# Patient Record
Sex: FEMALE | Race: WHITE | NOT HISPANIC OR LATINO | URBAN - METROPOLITAN AREA
[De-identification: names, ages, dates, MRNs, and addresses within clinical notes are randomized per-mention and may not be internally consistent; named-entity substitution may affect disease eponyms.]

---

## 2018-03-23 ENCOUNTER — INPATIENT (INPATIENT)
Facility: HOSPITAL | Age: 83
LOS: 3 days | Discharge: HOME HEALTH | DRG: 312 | End: 2018-03-27
Attending: INTERNAL MEDICINE | Admitting: INTERNAL MEDICINE
Payer: MEDICARE

## 2018-03-23 VITALS
HEART RATE: 80 BPM | DIASTOLIC BLOOD PRESSURE: 83 MMHG | OXYGEN SATURATION: 100 % | RESPIRATION RATE: 18 BRPM | SYSTOLIC BLOOD PRESSURE: 173 MMHG | TEMPERATURE: 98 F

## 2018-03-23 DIAGNOSIS — Z96.652 PRESENCE OF LEFT ARTIFICIAL KNEE JOINT: Chronic | ICD-10-CM

## 2018-03-23 LAB
ALBUMIN SERPL ELPH-MCNC: 3.9 G/DL — SIGNIFICANT CHANGE UP (ref 3.4–5)
ALP SERPL-CCNC: 130 U/L — HIGH (ref 40–120)
ALT FLD-CCNC: 16 U/L — SIGNIFICANT CHANGE UP (ref 12–42)
ANION GAP SERPL CALC-SCNC: 10 MMOL/L — SIGNIFICANT CHANGE UP (ref 9–16)
APPEARANCE UR: CLEAR — SIGNIFICANT CHANGE UP
APTT BLD: 27.2 SEC — LOW (ref 27.5–36.5)
AST SERPL-CCNC: 21 U/L — SIGNIFICANT CHANGE UP (ref 15–37)
BASOPHILS NFR BLD AUTO: 0.4 % — SIGNIFICANT CHANGE UP (ref 0–2)
BILIRUB SERPL-MCNC: 0.4 MG/DL — SIGNIFICANT CHANGE UP (ref 0.2–1.2)
BILIRUB UR-MCNC: NEGATIVE — SIGNIFICANT CHANGE UP
BUN SERPL-MCNC: 13 MG/DL — SIGNIFICANT CHANGE UP (ref 7–23)
CALCIUM SERPL-MCNC: 10.1 MG/DL — SIGNIFICANT CHANGE UP (ref 8.5–10.5)
CHLORIDE SERPL-SCNC: 98 MMOL/L — SIGNIFICANT CHANGE UP (ref 96–108)
CO2 SERPL-SCNC: 26 MMOL/L — SIGNIFICANT CHANGE UP (ref 22–31)
COLOR SPEC: YELLOW — SIGNIFICANT CHANGE UP
CREAT SERPL-MCNC: 0.98 MG/DL — SIGNIFICANT CHANGE UP (ref 0.5–1.3)
DIFF PNL FLD: (no result)
EOSINOPHIL NFR BLD AUTO: 0.3 % — SIGNIFICANT CHANGE UP (ref 0–6)
GLUCOSE SERPL-MCNC: 109 MG/DL — HIGH (ref 70–99)
GLUCOSE UR QL: NEGATIVE — SIGNIFICANT CHANGE UP
HCT VFR BLD CALC: 39.1 % — SIGNIFICANT CHANGE UP (ref 34.5–45)
HGB BLD-MCNC: 13.2 G/DL — SIGNIFICANT CHANGE UP (ref 11.5–15.5)
IMM GRANULOCYTES NFR BLD AUTO: 0.7 % — SIGNIFICANT CHANGE UP (ref 0–1.5)
INR BLD: 1 — SIGNIFICANT CHANGE UP (ref 0.88–1.16)
KETONES UR-MCNC: 15 MG/DL
LEUKOCYTE ESTERASE UR-ACNC: NEGATIVE — SIGNIFICANT CHANGE UP
LYMPHOCYTES # BLD AUTO: 14.8 % — SIGNIFICANT CHANGE UP (ref 13–44)
MAGNESIUM SERPL-MCNC: 1.9 MG/DL — SIGNIFICANT CHANGE UP (ref 1.6–2.6)
MCHC RBC-ENTMCNC: 33.4 PG — SIGNIFICANT CHANGE UP (ref 27–34)
MCHC RBC-ENTMCNC: 33.8 G/DL — SIGNIFICANT CHANGE UP (ref 32–36)
MCV RBC AUTO: 99 FL — SIGNIFICANT CHANGE UP (ref 80–100)
MONOCYTES NFR BLD AUTO: 10.2 % — SIGNIFICANT CHANGE UP (ref 2–14)
NEUTROPHILS NFR BLD AUTO: 73.6 % — SIGNIFICANT CHANGE UP (ref 43–77)
NITRITE UR-MCNC: NEGATIVE — SIGNIFICANT CHANGE UP
PH UR: 6 — SIGNIFICANT CHANGE UP (ref 5–8)
PLATELET # BLD AUTO: 227 K/UL — SIGNIFICANT CHANGE UP (ref 150–400)
POTASSIUM SERPL-MCNC: 3.9 MMOL/L — SIGNIFICANT CHANGE UP (ref 3.5–5.3)
POTASSIUM SERPL-SCNC: 3.9 MMOL/L — SIGNIFICANT CHANGE UP (ref 3.5–5.3)
PROT SERPL-MCNC: 7.9 G/DL — SIGNIFICANT CHANGE UP (ref 6.4–8.2)
PROT UR-MCNC: (no result) MG/DL
PROTHROM AB SERPL-ACNC: 11 SEC — SIGNIFICANT CHANGE UP (ref 9.8–12.7)
RBC # BLD: 3.95 M/UL — SIGNIFICANT CHANGE UP (ref 3.8–5.2)
RBC # FLD: 11.8 % — SIGNIFICANT CHANGE UP (ref 10.3–16.9)
SODIUM SERPL-SCNC: 134 MMOL/L — SIGNIFICANT CHANGE UP (ref 132–145)
SP GR SPEC: 1.01 — SIGNIFICANT CHANGE UP (ref 1–1.03)
TROPONIN I SERPL-MCNC: 0.06 NG/ML — HIGH (ref 0.02–0.06)
TROPONIN I SERPL-MCNC: 0.2 NG/ML — HIGH (ref 0.02–0.06)
TROPONIN I SERPL-MCNC: <0.017 NG/ML — LOW (ref 0.02–0.06)
UROBILINOGEN FLD QL: 0.2 E.U./DL — SIGNIFICANT CHANGE UP
WBC # BLD: 6.8 K/UL — SIGNIFICANT CHANGE UP (ref 3.8–10.5)
WBC # FLD AUTO: 6.8 K/UL — SIGNIFICANT CHANGE UP (ref 3.8–10.5)

## 2018-03-23 PROCEDURE — 99218: CPT | Mod: 25

## 2018-03-23 PROCEDURE — 70450 CT HEAD/BRAIN W/O DYE: CPT | Mod: 26

## 2018-03-23 PROCEDURE — 72131 CT LUMBAR SPINE W/O DYE: CPT | Mod: 26

## 2018-03-23 PROCEDURE — 99223 1ST HOSP IP/OBS HIGH 75: CPT

## 2018-03-23 PROCEDURE — 71045 X-RAY EXAM CHEST 1 VIEW: CPT | Mod: 26

## 2018-03-23 PROCEDURE — 93010 ELECTROCARDIOGRAM REPORT: CPT | Mod: 76

## 2018-03-23 RX ORDER — HEPARIN SODIUM 5000 [USP'U]/ML
4700 INJECTION INTRAVENOUS; SUBCUTANEOUS EVERY 6 HOURS
Qty: 0 | Refills: 0 | Status: DISCONTINUED | OUTPATIENT
Start: 2018-03-23 | End: 2018-03-24

## 2018-03-23 RX ORDER — HEPARIN SODIUM 5000 [USP'U]/ML
4700 INJECTION INTRAVENOUS; SUBCUTANEOUS ONCE
Qty: 0 | Refills: 0 | Status: COMPLETED | OUTPATIENT
Start: 2018-03-23 | End: 2018-03-24

## 2018-03-23 RX ORDER — MORPHINE SULFATE 50 MG/1
2 CAPSULE, EXTENDED RELEASE ORAL ONCE
Qty: 0 | Refills: 0 | Status: DISCONTINUED | OUTPATIENT
Start: 2018-03-23 | End: 2018-03-23

## 2018-03-23 RX ORDER — ACETAMINOPHEN 500 MG
650 TABLET ORAL ONCE
Qty: 0 | Refills: 0 | Status: COMPLETED | OUTPATIENT
Start: 2018-03-23 | End: 2018-03-23

## 2018-03-23 RX ORDER — HEPARIN SODIUM 5000 [USP'U]/ML
INJECTION INTRAVENOUS; SUBCUTANEOUS
Qty: 25000 | Refills: 0 | Status: DISCONTINUED | OUTPATIENT
Start: 2018-03-23 | End: 2018-03-24

## 2018-03-23 RX ORDER — ASPIRIN/CALCIUM CARB/MAGNESIUM 324 MG
325 TABLET ORAL ONCE
Qty: 0 | Refills: 0 | Status: COMPLETED | OUTPATIENT
Start: 2018-03-23 | End: 2018-03-23

## 2018-03-23 RX ORDER — MORPHINE SULFATE 50 MG/1
4 CAPSULE, EXTENDED RELEASE ORAL ONCE
Qty: 0 | Refills: 0 | Status: DISCONTINUED | OUTPATIENT
Start: 2018-03-23 | End: 2018-03-23

## 2018-03-23 RX ADMIN — Medication 650 MILLIGRAM(S): at 17:10

## 2018-03-23 RX ADMIN — Medication 325 MILLIGRAM(S): at 20:02

## 2018-03-23 RX ADMIN — MORPHINE SULFATE 2 MILLIGRAM(S): 50 CAPSULE, EXTENDED RELEASE ORAL at 18:00

## 2018-03-23 RX ADMIN — MORPHINE SULFATE 2 MILLIGRAM(S): 50 CAPSULE, EXTENDED RELEASE ORAL at 18:07

## 2018-03-23 NOTE — ED CDU PROVIDER DISPOSITION NOTE - CLINICAL COURSE
Patient's troponin trended up.  C/W NSTEMI.  EKG's remain stable and pt remains CP free.  Lower back pain persists but improved.  Confirms no upper back pain.  Case discussed with on call cardiologist at [facility] and accepts case for admission to cardiac tele.  Will start on heparin.

## 2018-03-23 NOTE — ED CDU PROVIDER INITIAL DAY NOTE - MEDICAL DECISION MAKING DETAILS
Pt requires admission for NSTEMI. Pt requires admission for NSTEMI.    Final read of L Spine CT returned and different from prelim read - +Compression fracture to L1 vertebrae.  Discussed with primary team.

## 2018-03-23 NOTE — ED PROVIDER NOTE - MUSCULOSKELETAL, MLM
Spine appears normal, range of motion is limited, left leg and knee pain Spine appears normal, range of motion is limited left leg and knee pain

## 2018-03-23 NOTE — ED ADULT NURSE NOTE - CHIEF COMPLAINT QUOTE
kenyetta doesn't remembered how she fell, but denies passing out. patient otherwise is able to answer questions properly, awake alert and oriented.   Patient also denies alcohol use. Complains also of right knee pain

## 2018-03-23 NOTE — ED PROVIDER NOTE - PROGRESS NOTE DETAILS
Pt still with severe LBP.  Initially offered morphine but declined and requested Tylenol - no relief so now accepts Morphine.  Pt uncomfortable going home as she lives alone.  Would like visiting nurse services but Pt still with severe LBP.  Initially offered morphine but declined and requested Tylenol - no relief so now accepts Morphine.  Pt uncomfortable going home as she lives alone.  Would like visiting nurse services but SW gone for the day.  Will place on observation for pain control, serial troponins, and eval by SW tomorrow.

## 2018-03-23 NOTE — ED ADULT TRIAGE NOTE - CHIEF COMPLAINT QUOTE
kenyetta doesn't remembered how she fell, but denies passing out. kenyetta doesn't remembered how she fell, but denies passing out. patient otherwise is able to answer questions properly, awake alert and oriented.   Patient also denies alcohol use. Complains also of right knee pain

## 2018-03-23 NOTE — ED PROVIDER NOTE - MEDICAL DECISION MAKING DETAILS
Syncope.  Likely head trauma.  IV, EKG, POCT blood sugar, labs, cardiac enzymes, CT head, CXR.  Will re-evaluate.    Flare of sciatica vs compression fracture - will perform CT of lumbar spine to r/o fracture.

## 2018-03-23 NOTE — ED PROVIDER NOTE - OBJECTIVE STATEMENT
Pt is a 83 y o F with PMHx of sciatica presents to the ED for a syncope episode. Pt recalls getting up from her couch to walk to the television when she suddenly found herself on the ground. Pt was unable to stand up due to chronic pain in her left knee from a left knee arthroplasty. Patient denies experiencing previous syncope episodes. She is unable to recall reasons for the fall. Pt reports exacerbation for lower back pain and sciatica. Denies CP, head trauma, headache, dizziness, numbness, weakness, or any other assoc symptoms. Pt is a 83 y o F with PMHx of sciatica pain presents to the ED for a syncopal episode. Pt recalls getting up from her couch to walk to the television when she suddenly found herself on the ground for unknown reasons. She was unable to stand up due to chronic pain in her left knee from a left knee arthroplasty and crawled to the bed and called EMS. Patient denies experiencing previous syncopal episodes. . Pt reports exacerbation for lower back pain and sciatica. Denies CP, head trauma, headache, dizziness, numbness, weakness, or any other assoc symptoms. Pt is a 81 yo F with PMHx of sciatica pain presents to the ED for a syncopal episode. Pt recalls getting up from her couch very quickly to walk to the television when she suddenly found herself on the ground for unknown reasons. She was unable to stand up due to chronic pain in her left knee from a left knee arthroplasty and crawled to the bed and called EMS.  Patient denies experiencing previous syncopal episodes.  Pt reports exacerbation for lower back pain and sciatica. Denies CP, head trauma, headache, dizziness, numbness, weakness, or any other assoc symptoms.

## 2018-03-23 NOTE — ED CDU PROVIDER INITIAL DAY NOTE - PROGRESS NOTE DETAILS
Elevated trop noted - mild.  Discussed with on call cardiology - Dr. Wilkinson.  Reports possible transient elevation for episodic hypoperfusion from sycopal episode.  Recommends keeping her on observation and checking another troponin ~4-6 hours after the first.  Recommends asa but no other treatment at this time given that pt has been pain free the entire time. Trop is increasing - concern for NSTEMI.  Re-evaluated pt and currently without CP.  CXR re-examined and confirmed lungs and mediastinum are WNL.  Will repeat EKG now.  Will get weight and start on heparin.  Will admit to cardiology.

## 2018-03-23 NOTE — ED CDU PROVIDER INITIAL DAY NOTE - OBJECTIVE STATEMENT
Pt is a 81 yo F with PMHx of sciatica pain presents to the ED for a syncopal episode. Pt recalls getting up from her couch very quickly to walk to the television when she suddenly found herself on the ground for unknown reasons. She was unable to stand up due to chronic pain in her left knee from a left knee arthroplasty and crawled to the bed and called EMS.  Patient denies experiencing previous syncopal episodes.  Pt reports exacerbation for lower back pain and sciatica. Denies CP, head trauma, headache, dizziness, numbness, weakness, or any other assoc symptoms.

## 2018-03-23 NOTE — ED CDU PROVIDER INITIAL DAY NOTE - DETAILS
Pt with syncopal episode.  W/U negative so far.  Will need serial troponin checks.  Also has exacerbation of sciatica from fall.  Will need pain control.  Will have evaluated by SW for possible visiting nurse services.

## 2018-03-23 NOTE — ED ADULT NURSE REASSESSMENT NOTE - NS ED NURSE REASSESS COMMENT FT1
Received patient from BRODY Harden. Patient states tylenol did not help and is in pain. MD Wu made aware and will order Morphine 2mg IVP. Patient in NAD, resting comfortably and will continue to monitor.

## 2018-03-24 DIAGNOSIS — M54.5 LOW BACK PAIN: ICD-10-CM

## 2018-03-24 DIAGNOSIS — M54.30 SCIATICA, UNSPECIFIED SIDE: ICD-10-CM

## 2018-03-24 DIAGNOSIS — R63.8 OTHER SYMPTOMS AND SIGNS CONCERNING FOOD AND FLUID INTAKE: ICD-10-CM

## 2018-03-24 DIAGNOSIS — E03.9 HYPOTHYROIDISM, UNSPECIFIED: ICD-10-CM

## 2018-03-24 DIAGNOSIS — G43.909 MIGRAINE, UNSPECIFIED, NOT INTRACTABLE, WITHOUT STATUS MIGRAINOSUS: ICD-10-CM

## 2018-03-24 DIAGNOSIS — I21.4 NON-ST ELEVATION (NSTEMI) MYOCARDIAL INFARCTION: ICD-10-CM

## 2018-03-24 DIAGNOSIS — I10 ESSENTIAL (PRIMARY) HYPERTENSION: ICD-10-CM

## 2018-03-24 DIAGNOSIS — B02.9 ZOSTER WITHOUT COMPLICATIONS: ICD-10-CM

## 2018-03-24 DIAGNOSIS — Z29.9 ENCOUNTER FOR PROPHYLACTIC MEASURES, UNSPECIFIED: ICD-10-CM

## 2018-03-24 DIAGNOSIS — R55 SYNCOPE AND COLLAPSE: ICD-10-CM

## 2018-03-24 LAB
ANION GAP SERPL CALC-SCNC: 13 MMOL/L — SIGNIFICANT CHANGE UP (ref 5–17)
APTT BLD: 166 SEC — CRITICAL HIGH (ref 27.5–37.4)
APTT BLD: 59.4 SEC — HIGH (ref 27.5–37.4)
BUN SERPL-MCNC: 8 MG/DL — SIGNIFICANT CHANGE UP (ref 7–23)
CALCIUM SERPL-MCNC: 9.6 MG/DL — SIGNIFICANT CHANGE UP (ref 8.4–10.5)
CHLORIDE SERPL-SCNC: 91 MMOL/L — LOW (ref 96–108)
CK MB BLD-MCNC: 1.44 % — SIGNIFICANT CHANGE UP
CK MB BLD-MCNC: 1.46 % — SIGNIFICANT CHANGE UP
CK MB BLD-MCNC: 1.67 % — SIGNIFICANT CHANGE UP
CK MB CFR SERPL CALC: 3.2 NG/ML — SIGNIFICANT CHANGE UP (ref 0.5–3.6)
CK MB CFR SERPL CALC: 3.7 NG/ML — HIGH (ref 0.5–3.6)
CK MB CFR SERPL CALC: 3.8 NG/ML — HIGH (ref 0.5–3.6)
CK MB CFR SERPL CALC: 4.8 NG/ML — SIGNIFICANT CHANGE UP (ref 0–6.7)
CK SERPL-CCNC: 192 U/L — SIGNIFICANT CHANGE UP (ref 26–192)
CK SERPL-CCNC: 254 U/L — HIGH (ref 26–192)
CK SERPL-CCNC: 263 U/L — HIGH (ref 26–192)
CK SERPL-CCNC: 333 U/L — HIGH (ref 25–170)
CO2 SERPL-SCNC: 26 MMOL/L — SIGNIFICANT CHANGE UP (ref 22–31)
CREAT SERPL-MCNC: 0.79 MG/DL — SIGNIFICANT CHANGE UP (ref 0.5–1.3)
CULTURE RESULTS: SIGNIFICANT CHANGE UP
GLUCOSE SERPL-MCNC: 106 MG/DL — HIGH (ref 70–99)
HCT VFR BLD CALC: 36.6 % — SIGNIFICANT CHANGE UP (ref 34.5–45)
HGB BLD-MCNC: 12.7 G/DL — SIGNIFICANT CHANGE UP (ref 11.5–15.5)
MAGNESIUM SERPL-MCNC: 2 MG/DL — SIGNIFICANT CHANGE UP (ref 1.6–2.6)
MCHC RBC-ENTMCNC: 33.3 PG — SIGNIFICANT CHANGE UP (ref 27–34)
MCHC RBC-ENTMCNC: 34.7 G/DL — SIGNIFICANT CHANGE UP (ref 32–36)
MCV RBC AUTO: 96.1 FL — SIGNIFICANT CHANGE UP (ref 80–100)
PLATELET # BLD AUTO: 258 K/UL — SIGNIFICANT CHANGE UP (ref 150–400)
POTASSIUM SERPL-MCNC: 3.8 MMOL/L — SIGNIFICANT CHANGE UP (ref 3.5–5.3)
POTASSIUM SERPL-SCNC: 3.8 MMOL/L — SIGNIFICANT CHANGE UP (ref 3.5–5.3)
RBC # BLD: 3.81 M/UL — SIGNIFICANT CHANGE UP (ref 3.8–5.2)
RBC # FLD: 11.7 % — SIGNIFICANT CHANGE UP (ref 10.3–16.9)
SODIUM SERPL-SCNC: 130 MMOL/L — LOW (ref 135–145)
SPECIMEN SOURCE: SIGNIFICANT CHANGE UP
TROPONIN T SERPL-MCNC: <0.01 NG/ML — SIGNIFICANT CHANGE UP (ref 0–0.01)
WBC # BLD: 7.3 K/UL — SIGNIFICANT CHANGE UP (ref 3.8–10.5)
WBC # FLD AUTO: 7.3 K/UL — SIGNIFICANT CHANGE UP (ref 3.8–10.5)

## 2018-03-24 PROCEDURE — 93010 ELECTROCARDIOGRAM REPORT: CPT

## 2018-03-24 PROCEDURE — 99233 SBSQ HOSP IP/OBS HIGH 50: CPT

## 2018-03-24 RX ORDER — POTASSIUM CHLORIDE 20 MEQ
20 PACKET (EA) ORAL ONCE
Qty: 0 | Refills: 0 | Status: COMPLETED | OUTPATIENT
Start: 2018-03-24 | End: 2018-03-24

## 2018-03-24 RX ORDER — DIPHENHYDRAMINE HYDROCHLORIDE AND LIDOCAINE HYDROCHLORIDE AND ALUMINUM HYDROXIDE AND MAGNESIUM HYDRO
20 KIT
Qty: 0 | Refills: 0 | Status: DISCONTINUED | OUTPATIENT
Start: 2018-03-24 | End: 2018-03-24

## 2018-03-24 RX ORDER — OXYCODONE AND ACETAMINOPHEN 5; 325 MG/1; MG/1
1 TABLET ORAL EVERY 6 HOURS
Qty: 0 | Refills: 0 | Status: DISCONTINUED | OUTPATIENT
Start: 2018-03-24 | End: 2018-03-26

## 2018-03-24 RX ORDER — METOPROLOL TARTRATE 50 MG
25 TABLET ORAL ONCE
Qty: 0 | Refills: 0 | Status: COMPLETED | OUTPATIENT
Start: 2018-03-24 | End: 2018-03-24

## 2018-03-24 RX ORDER — PREGABALIN 225 MG/1
1000 CAPSULE ORAL DAILY
Qty: 0 | Refills: 0 | Status: DISCONTINUED | OUTPATIENT
Start: 2018-03-24 | End: 2018-03-27

## 2018-03-24 RX ORDER — TIMOLOL 0.5 %
1 DROPS OPHTHALMIC (EYE)
Qty: 0 | Refills: 0 | COMMUNITY

## 2018-03-24 RX ORDER — ATORVASTATIN CALCIUM 80 MG/1
10 TABLET, FILM COATED ORAL AT BEDTIME
Qty: 0 | Refills: 0 | Status: DISCONTINUED | OUTPATIENT
Start: 2018-03-24 | End: 2018-03-24

## 2018-03-24 RX ORDER — CHOLECALCIFEROL (VITAMIN D3) 125 MCG
1000 CAPSULE ORAL DAILY
Qty: 0 | Refills: 0 | Status: DISCONTINUED | OUTPATIENT
Start: 2018-03-24 | End: 2018-03-27

## 2018-03-24 RX ORDER — FUROSEMIDE 40 MG
40 TABLET ORAL
Qty: 0 | Refills: 0 | Status: DISCONTINUED | OUTPATIENT
Start: 2018-03-24 | End: 2018-03-24

## 2018-03-24 RX ORDER — VALACYCLOVIR 500 MG/1
1000 TABLET, FILM COATED ORAL THREE TIMES A DAY
Qty: 0 | Refills: 0 | Status: DISCONTINUED | OUTPATIENT
Start: 2018-03-24 | End: 2018-03-27

## 2018-03-24 RX ORDER — GABAPENTIN 400 MG/1
100 CAPSULE ORAL THREE TIMES A DAY
Qty: 0 | Refills: 0 | Status: DISCONTINUED | OUTPATIENT
Start: 2018-03-24 | End: 2018-03-27

## 2018-03-24 RX ORDER — AMLODIPINE BESYLATE 2.5 MG/1
10 TABLET ORAL DAILY
Qty: 0 | Refills: 0 | Status: DISCONTINUED | OUTPATIENT
Start: 2018-03-24 | End: 2018-03-27

## 2018-03-24 RX ORDER — ATORVASTATIN CALCIUM 80 MG/1
40 TABLET, FILM COATED ORAL AT BEDTIME
Qty: 0 | Refills: 0 | Status: DISCONTINUED | OUTPATIENT
Start: 2018-03-24 | End: 2018-03-27

## 2018-03-24 RX ORDER — TIMOLOL 0.5 %
1 DROPS OPHTHALMIC (EYE)
Qty: 0 | Refills: 0 | Status: DISCONTINUED | OUTPATIENT
Start: 2018-03-24 | End: 2018-03-24

## 2018-03-24 RX ORDER — MORPHINE SULFATE 50 MG/1
2 CAPSULE, EXTENDED RELEASE ORAL ONCE
Qty: 0 | Refills: 0 | Status: DISCONTINUED | OUTPATIENT
Start: 2018-03-24 | End: 2018-03-24

## 2018-03-24 RX ORDER — ASPIRIN/CALCIUM CARB/MAGNESIUM 324 MG
81 TABLET ORAL DAILY
Qty: 0 | Refills: 0 | Status: DISCONTINUED | OUTPATIENT
Start: 2018-03-24 | End: 2018-03-27

## 2018-03-24 RX ORDER — FUROSEMIDE 40 MG
40 TABLET ORAL DAILY
Qty: 0 | Refills: 0 | Status: DISCONTINUED | OUTPATIENT
Start: 2018-03-25 | End: 2018-03-25

## 2018-03-24 RX ORDER — LEVOTHYROXINE SODIUM 125 MCG
50 TABLET ORAL DAILY
Qty: 0 | Refills: 0 | Status: DISCONTINUED | OUTPATIENT
Start: 2018-03-24 | End: 2018-03-27

## 2018-03-24 RX ORDER — HEPARIN SODIUM 5000 [USP'U]/ML
750 INJECTION INTRAVENOUS; SUBCUTANEOUS
Qty: 25000 | Refills: 0 | Status: DISCONTINUED | OUTPATIENT
Start: 2018-03-24 | End: 2018-03-24

## 2018-03-24 RX ORDER — ACETAMINOPHEN 500 MG
650 TABLET ORAL EVERY 6 HOURS
Qty: 0 | Refills: 0 | Status: DISCONTINUED | OUTPATIENT
Start: 2018-03-24 | End: 2018-03-27

## 2018-03-24 RX ORDER — PANTOPRAZOLE SODIUM 20 MG/1
40 TABLET, DELAYED RELEASE ORAL
Qty: 0 | Refills: 0 | Status: DISCONTINUED | OUTPATIENT
Start: 2018-03-24 | End: 2018-03-27

## 2018-03-24 RX ADMIN — Medication 40 MILLIGRAM(S): at 14:58

## 2018-03-24 RX ADMIN — Medication 650 MILLIGRAM(S): at 21:06

## 2018-03-24 RX ADMIN — GABAPENTIN 100 MILLIGRAM(S): 400 CAPSULE ORAL at 06:31

## 2018-03-24 RX ADMIN — Medication 1 CAPSULE(S): at 05:30

## 2018-03-24 RX ADMIN — Medication 650 MILLIGRAM(S): at 22:00

## 2018-03-24 RX ADMIN — MORPHINE SULFATE 2 MILLIGRAM(S): 50 CAPSULE, EXTENDED RELEASE ORAL at 05:25

## 2018-03-24 RX ADMIN — PANTOPRAZOLE SODIUM 40 MILLIGRAM(S): 20 TABLET, DELAYED RELEASE ORAL at 06:31

## 2018-03-24 RX ADMIN — Medication 1 CAPSULE(S): at 04:23

## 2018-03-24 RX ADMIN — VALACYCLOVIR 1000 MILLIGRAM(S): 500 TABLET, FILM COATED ORAL at 06:31

## 2018-03-24 RX ADMIN — HEPARIN SODIUM 950 UNIT(S)/HR: 5000 INJECTION INTRAVENOUS; SUBCUTANEOUS at 00:27

## 2018-03-24 RX ADMIN — VALACYCLOVIR 1000 MILLIGRAM(S): 500 TABLET, FILM COATED ORAL at 21:06

## 2018-03-24 RX ADMIN — Medication 20 MILLIEQUIVALENT(S): at 19:03

## 2018-03-24 RX ADMIN — Medication 50 MICROGRAM(S): at 06:31

## 2018-03-24 RX ADMIN — Medication 1 CAPSULE(S): at 13:05

## 2018-03-24 RX ADMIN — Medication 81 MILLIGRAM(S): at 11:22

## 2018-03-24 RX ADMIN — Medication 1000 UNIT(S): at 11:22

## 2018-03-24 RX ADMIN — MORPHINE SULFATE 2 MILLIGRAM(S): 50 CAPSULE, EXTENDED RELEASE ORAL at 05:02

## 2018-03-24 RX ADMIN — Medication 1 CAPSULE(S): at 14:00

## 2018-03-24 RX ADMIN — ATORVASTATIN CALCIUM 40 MILLIGRAM(S): 80 TABLET, FILM COATED ORAL at 21:06

## 2018-03-24 RX ADMIN — AMLODIPINE BESYLATE 10 MILLIGRAM(S): 2.5 TABLET ORAL at 06:31

## 2018-03-24 RX ADMIN — Medication 1 TABLET(S): at 11:22

## 2018-03-24 RX ADMIN — HEPARIN SODIUM 7.5 UNIT(S)/HR: 5000 INJECTION INTRAVENOUS; SUBCUTANEOUS at 09:10

## 2018-03-24 RX ADMIN — HEPARIN SODIUM 4700 UNIT(S): 5000 INJECTION INTRAVENOUS; SUBCUTANEOUS at 00:27

## 2018-03-24 RX ADMIN — GABAPENTIN 100 MILLIGRAM(S): 400 CAPSULE ORAL at 13:05

## 2018-03-24 RX ADMIN — VALACYCLOVIR 1000 MILLIGRAM(S): 500 TABLET, FILM COATED ORAL at 13:05

## 2018-03-24 RX ADMIN — GABAPENTIN 100 MILLIGRAM(S): 400 CAPSULE ORAL at 21:06

## 2018-03-24 RX ADMIN — PREGABALIN 1000 MICROGRAM(S): 225 CAPSULE ORAL at 11:22

## 2018-03-24 RX ADMIN — Medication 25 MILLIGRAM(S): at 00:29

## 2018-03-24 NOTE — PATIENT PROFILE ADULT. - ABILITY TO HEAR (WITH HEARING AID OR HEARING APPLIANCE IF NORMALLY USED):
chandrakant. hearing aides/Mildly to Moderately Impaired: difficulty hearing in some environments or speaker may need to increase volume or speak distinctly

## 2018-03-24 NOTE — H&P ADULT - PROBLEM SELECTOR PLAN 2
Patient with rising troponins, no ST-T wave changes on EKG. No chest pain, so doubt type 1 ischemic event. Will trend to peak.   Treat as ACS with Heparin gtt titrate to 53-73  Increase to Lipitor 40mg.  Echo in AM

## 2018-03-24 NOTE — H&P ADULT - NSHPLABSRESULTS_GEN_ALL_CORE
LABS:                       13.2   6.8   )-----------( 227      ( 23 Mar 2018 14:35 )             39.1       134  |  98  |  13  ----------------------------<  109<H>  3.9   |  26  |  0.98    Ca    10.1      23 Mar 2018 14:35  Mg     1.9         TPro  7.9  /  Alb  3.9  /  TBili  0.4  /  DBili  x   /  AST  21  /  ALT  16  /  AlkPhos  130<H>  0323    PT/INR - ( 23 Mar 2018 14:35 )   PT: 11.0 sec;   INR: 1.00        PTT - ( 23 Mar 2018 14:35 )  PTT:27.2 sec  Urinalysis Basic - ( 23 Mar 2018 17:03 )    Color: Yellow / Appearance: Clear / S.015 / pH: x  Gluc: x / Ketone: 15 mg/dL  / Bili: NEGATIVE / Urobili: 0.2 E.U./dL   Blood: x / Protein: Trace mg/dL / Nitrite: NEGATIVE   Leuk Esterase: NEGATIVE / RBC: 5-10 /HPF / WBC x   Sq Epi: x / Non Sq Epi: x / Bacteria: x    CARDIAC MARKERS ( 24 Mar 2018 00:12 )  x     / x     / 192 U/L / x     / 3.2 ng/mL  CARDIAC MARKERS ( 24 Mar 2018 00:08 )  x     / x     / 254 U/L / x     / 3.7 ng/mL  CARDIAC MARKERS ( 23 Mar 2018 23:08 )  0.201 ng/mL / x     / x     / x     / x      CARDIAC MARKERS ( 23 Mar 2018 17:55 )  0.059 ng/mL / x     / x     / x     / x      CARDIAC MARKERS ( 23 Mar 2018 14:35 )  <0.017 ng/mL / x     / x     / x     / x        RADIOLOGY, EKG & ADDITIONAL TESTS: Reviewed.   EXAM:  CT LUMBAR SPINE  Impression: Mild compression fracture of the L1 vertebral body, age   indeterminate.. Moderate degenerative changes at L3/L4 andL4-L5. L3/L4   mild to moderate spinal canal stenosis.    CT Head No Cont    IMPRESSION:      1. No CT evidence of acute intracranial hemorrhage. No apparent acute   abnormality.  2. Moderate to severe chronic microvascular disease. If there remains   concern for acute infarct, consideration could be given to brain MR with   diffusion-weighted imaging as this is a more sensitive study for its   detection.

## 2018-03-24 NOTE — H&P ADULT - PROBLEM SELECTOR PLAN 4
Chronic lower back/sciatica pain. CT lumbar showed L1 compression fracture.   No signs of neurological impairment, reflexes intact.   Percocet for moderate pain.

## 2018-03-24 NOTE — H&P ADULT - HISTORY OF PRESENT ILLNESS
82/F PMHx sciatica, hypothyroidism, HTN, who presents to [facility] after syncopal episode at home. She states that she got up from her couch to change the channel on the TV and then she stated that she fell. She says she thinks she lost consciousness but is not sure. She states that she had no confusion afterwards, crawled to her bed and called EMS. She has never fainted before. She has chronic back pain that has been worse recently. No palpitations, never had chest pain. sees a cardiologist, but hasnt had any cardiac workup in years as per her.   Denied head trauma.    ICU Vital Signs Last 24 Hrs  T(C): 36.7 (24 Mar 2018 00:11), Max: 37 (23 Mar 2018 21:20)  T(F): 98 (24 Mar 2018 00:11), Max: 98.6 (23 Mar 2018 21:20)  HR: 60 (24 Mar 2018 05:09) (60 - 83)  BP: 147/47 (24 Mar 2018 05:09) (145/65 - 200/80)  BP(mean): 72 (24 Mar 2018 05:09) (72 - 123)  ABP: --  ABP(mean): --  RR: 25 (24 Mar 2018 05:09) (16 - 25)  SpO2: 94% (24 Mar 2018 05:09) (94% - 100%)    given Morphine 2mg in ED.

## 2018-03-24 NOTE — PATIENT PROFILE ADULT. - FALL HARM RISK
coagulation(Bleeding disorder R/T clinical cond/anti-coags)/other/bones(Osteoporosis,prev fx,steroid use,metastatic bone ca

## 2018-03-24 NOTE — H&P ADULT - PROBLEM SELECTOR PLAN 7
Patient with few day history of left lateral thigh and gluteal rash, very suspicious for shingles.   Start Valacyclovir 1000mg tid for 7 days   Start Gabapentin 100tid for neuropathic pain, titrate up as tolerated.

## 2018-03-24 NOTE — H&P ADULT - PROBLEM SELECTOR PLAN 1
Patient with syncopal episode this AM. She thinks that she may have lost consciousness. Differential includes vasovagal syncope, cardiogenic syncope, neurogenic syncope.   Unlikely neurogenic syncope as no seizure like activity. Now back to baseline  Possibly cardiogenic in nature as she did have positive troponins and also possibly vasovagal in nature as she had episode after getting up quickly.   Will keep on telemetry and follow  Echo in AM   Follow up EKGS

## 2018-03-25 DIAGNOSIS — E87.70 FLUID OVERLOAD, UNSPECIFIED: ICD-10-CM

## 2018-03-25 DIAGNOSIS — R07.9 CHEST PAIN, UNSPECIFIED: ICD-10-CM

## 2018-03-25 LAB
ANION GAP SERPL CALC-SCNC: 12 MMOL/L — SIGNIFICANT CHANGE UP (ref 5–17)
BUN SERPL-MCNC: 14 MG/DL — SIGNIFICANT CHANGE UP (ref 7–23)
CALCIUM SERPL-MCNC: 9 MG/DL — SIGNIFICANT CHANGE UP (ref 8.4–10.5)
CHLORIDE SERPL-SCNC: 92 MMOL/L — LOW (ref 96–108)
CO2 SERPL-SCNC: 26 MMOL/L — SIGNIFICANT CHANGE UP (ref 22–31)
CREAT SERPL-MCNC: 0.95 MG/DL — SIGNIFICANT CHANGE UP (ref 0.5–1.3)
GLUCOSE SERPL-MCNC: 91 MG/DL — SIGNIFICANT CHANGE UP (ref 70–99)
HCT VFR BLD CALC: 35.2 % — SIGNIFICANT CHANGE UP (ref 34.5–45)
HGB BLD-MCNC: 11.8 G/DL — SIGNIFICANT CHANGE UP (ref 11.5–15.5)
MAGNESIUM SERPL-MCNC: 1.8 MG/DL — SIGNIFICANT CHANGE UP (ref 1.6–2.6)
MCHC RBC-ENTMCNC: 33.4 PG — SIGNIFICANT CHANGE UP (ref 27–34)
MCHC RBC-ENTMCNC: 33.5 G/DL — SIGNIFICANT CHANGE UP (ref 32–36)
MCV RBC AUTO: 99.7 FL — SIGNIFICANT CHANGE UP (ref 80–100)
NT-PROBNP SERPL-SCNC: 132 PG/ML — SIGNIFICANT CHANGE UP (ref 0–300)
PLATELET # BLD AUTO: 236 K/UL — SIGNIFICANT CHANGE UP (ref 150–400)
POTASSIUM SERPL-MCNC: 3.8 MMOL/L — SIGNIFICANT CHANGE UP (ref 3.5–5.3)
POTASSIUM SERPL-SCNC: 3.8 MMOL/L — SIGNIFICANT CHANGE UP (ref 3.5–5.3)
RBC # BLD: 3.53 M/UL — LOW (ref 3.8–5.2)
RBC # FLD: 12.9 % — SIGNIFICANT CHANGE UP (ref 10.3–16.9)
SODIUM SERPL-SCNC: 130 MMOL/L — LOW (ref 135–145)
WBC # BLD: 5.3 K/UL — SIGNIFICANT CHANGE UP (ref 3.8–10.5)
WBC # FLD AUTO: 5.3 K/UL — SIGNIFICANT CHANGE UP (ref 3.8–10.5)

## 2018-03-25 PROCEDURE — 99233 SBSQ HOSP IP/OBS HIGH 50: CPT

## 2018-03-25 RX ORDER — FUROSEMIDE 40 MG
20 TABLET ORAL DAILY
Qty: 0 | Refills: 0 | Status: DISCONTINUED | OUTPATIENT
Start: 2018-03-26 | End: 2018-03-26

## 2018-03-25 RX ORDER — POTASSIUM CHLORIDE 20 MEQ
20 PACKET (EA) ORAL ONCE
Qty: 0 | Refills: 0 | Status: COMPLETED | OUTPATIENT
Start: 2018-03-25 | End: 2018-03-25

## 2018-03-25 RX ORDER — HEPARIN SODIUM 5000 [USP'U]/ML
5000 INJECTION INTRAVENOUS; SUBCUTANEOUS EVERY 8 HOURS
Qty: 0 | Refills: 0 | Status: DISCONTINUED | OUTPATIENT
Start: 2018-03-25 | End: 2018-03-27

## 2018-03-25 RX ADMIN — OXYCODONE AND ACETAMINOPHEN 1 TABLET(S): 5; 325 TABLET ORAL at 11:17

## 2018-03-25 RX ADMIN — AMLODIPINE BESYLATE 10 MILLIGRAM(S): 2.5 TABLET ORAL at 06:10

## 2018-03-25 RX ADMIN — OXYCODONE AND ACETAMINOPHEN 1 TABLET(S): 5; 325 TABLET ORAL at 20:20

## 2018-03-25 RX ADMIN — VALACYCLOVIR 1000 MILLIGRAM(S): 500 TABLET, FILM COATED ORAL at 06:10

## 2018-03-25 RX ADMIN — Medication 1 CAPSULE(S): at 15:00

## 2018-03-25 RX ADMIN — Medication 81 MILLIGRAM(S): at 08:45

## 2018-03-25 RX ADMIN — Medication 650 MILLIGRAM(S): at 06:51

## 2018-03-25 RX ADMIN — OXYCODONE AND ACETAMINOPHEN 1 TABLET(S): 5; 325 TABLET ORAL at 21:30

## 2018-03-25 RX ADMIN — GABAPENTIN 100 MILLIGRAM(S): 400 CAPSULE ORAL at 06:10

## 2018-03-25 RX ADMIN — Medication 50 MICROGRAM(S): at 06:10

## 2018-03-25 RX ADMIN — Medication 20 MILLIEQUIVALENT(S): at 08:45

## 2018-03-25 RX ADMIN — Medication 1000 UNIT(S): at 10:42

## 2018-03-25 RX ADMIN — GABAPENTIN 100 MILLIGRAM(S): 400 CAPSULE ORAL at 14:27

## 2018-03-25 RX ADMIN — Medication 1 CAPSULE(S): at 14:27

## 2018-03-25 RX ADMIN — VALACYCLOVIR 1000 MILLIGRAM(S): 500 TABLET, FILM COATED ORAL at 21:30

## 2018-03-25 RX ADMIN — OXYCODONE AND ACETAMINOPHEN 1 TABLET(S): 5; 325 TABLET ORAL at 11:53

## 2018-03-25 RX ADMIN — ATORVASTATIN CALCIUM 40 MILLIGRAM(S): 80 TABLET, FILM COATED ORAL at 21:30

## 2018-03-25 RX ADMIN — HEPARIN SODIUM 5000 UNIT(S): 5000 INJECTION INTRAVENOUS; SUBCUTANEOUS at 14:27

## 2018-03-25 RX ADMIN — Medication 650 MILLIGRAM(S): at 05:44

## 2018-03-25 RX ADMIN — PREGABALIN 1000 MICROGRAM(S): 225 CAPSULE ORAL at 10:42

## 2018-03-25 RX ADMIN — HEPARIN SODIUM 5000 UNIT(S): 5000 INJECTION INTRAVENOUS; SUBCUTANEOUS at 21:30

## 2018-03-25 RX ADMIN — VALACYCLOVIR 1000 MILLIGRAM(S): 500 TABLET, FILM COATED ORAL at 14:27

## 2018-03-25 RX ADMIN — Medication 40 MILLIGRAM(S): at 07:18

## 2018-03-25 RX ADMIN — Medication 1 TABLET(S): at 08:45

## 2018-03-25 RX ADMIN — GABAPENTIN 100 MILLIGRAM(S): 400 CAPSULE ORAL at 21:30

## 2018-03-25 RX ADMIN — PANTOPRAZOLE SODIUM 40 MILLIGRAM(S): 20 TABLET, DELAYED RELEASE ORAL at 06:10

## 2018-03-25 NOTE — PROGRESS NOTE ADULT - PROBLEM SELECTOR PLAN 2
Patient with rising troponins, no ST-T wave changes on EKG. Reported some non-specific chest pain, doubt ACS.  Hold of heparin drip.  Increase to Lipitor 40mg.  F/u with Echo  Lexiscan on Monday. Non-anginal chest pain. Patient with rising troponins, no ST-T wave changes on EKG. Reported some non-specific chest pain, doubt ACS.  Hold of heparin drip.  Increase to Lipitor 40mg.  F/u with Echo  Lexiscan on Monday.

## 2018-03-25 NOTE — PROGRESS NOTE ADULT - SUBJECTIVE AND OBJECTIVE BOX
Pt examined at bedside.   Complained of pain on leg, chest, back and thigh, no new weakness/numbness    V/S    T(F): 98.4 (03-25-18 @ 05:12), Max: 98.4 (03-25-18 @ 05:12)  HR: 68 (03-25-18 @ 11:20)  BP: 110/44 (03-25-18 @ 11:20)  RR: 19 (03-25-18 @ 11:20)  SpO2: 95% (03-25-18 @ 11:20)  Wt(kg): --  PE     GEN - Appears stated age. No distress.           HEENT - NCAT, EOMI, PERRLA, MMM, facial asymmetry due to missing front dentition           CVS - RRR, no M/R/G, no JVD           PULM - bibasiler crackles           ABD - Soft, nontender, nondistended, normal BS           EXT - Distal extremities warm, no cyanosis, 1+ pitting edema on ankles bilaterally. Tender to touch on LEs           NEURO - AOx3, Moves all extremities.     LAB                        11.8   5.3   )-----------( 236      ( 25 Mar 2018 06:10 )             35.2               03-25    130<L>  |  92<L>  |  14  ----------------------------<  91  3.8   |  26  |  0.95    Ca    9.0      25 Mar 2018 06:10  Mg     1.8     03-25    TPro  7.9  /  Alb  3.9  /  TBili  0.4  /  DBili  x   /  AST  21  /  ALT  16  /  AlkPhos  130<H>  03-23              PT/INR - ( 23 Mar 2018 14:35 )   PT: 11.0 sec;   INR: 1.00          PTT - ( 24 Mar 2018 15:39 )  PTT:59.4 sec            LIVER FUNCTIONS - ( 23 Mar 2018 14:35 )  Alb: 3.9 g/dL / Pro: 7.9 g/dL / ALK PHOS: 130 U/L / ALT: 16 U/L / AST: 21 U/L / GGT: x           I&O's Detail    24 Mar 2018 07:01  -  25 Mar 2018 07:00  --------------------------------------------------------  IN:    heparin  Infusion.: 30 mL    Oral Fluid: 430 mL  Total IN: 460 mL    OUT:    Voided: 1050 mL  Total OUT: 1050 mL    Total NET: -590 mL      25 Mar 2018 07:01  -  25 Mar 2018 11:27  --------------------------------------------------------  IN:  Total IN: 0 mL    OUT:    Voided: 1150 mL  Total OUT: 1150 mL    Total NET: -1150 mL            Additional tests & radiology reviewed. Pt examined at bedside.   Complained of pain on leg, chest, back and thigh, no new weakness/numbness    V/S    T(F): 98.4 (03-25-18 @ 05:12), Max: 98.4 (03-25-18 @ 05:12)  HR: 68 (03-25-18 @ 11:20)  BP: 110/44 (03-25-18 @ 11:20)  RR: 19 (03-25-18 @ 11:20)  SpO2: 95% (03-25-18 @ 11:20)  Wt(kg): --  PE     GEN - Appears stated age. No distress.           HEENT - NCAT, EOMI, PERRLA, MMM, facial asymmetry due to missing front dentition           CVS - RRR, no M/R/G, no JVD           PULM - bibasiler crackles           ABD - Soft, nontender, nondistended, normal BS           EXT - Distal extremities warm, no cyanosis, 1+ pitting edema on ankles bilaterally. Tender to touch on LEs. Some vesicles on anterior left upper thigh.           NEURO - AOx3, Moves all extremities.     LAB                        11.8   5.3   )-----------( 236      ( 25 Mar 2018 06:10 )             35.2               03-25    130<L>  |  92<L>  |  14  ----------------------------<  91  3.8   |  26  |  0.95    Ca    9.0      25 Mar 2018 06:10  Mg     1.8     03-25    TPro  7.9  /  Alb  3.9  /  TBili  0.4  /  DBili  x   /  AST  21  /  ALT  16  /  AlkPhos  130<H>  03-23              PT/INR - ( 23 Mar 2018 14:35 )   PT: 11.0 sec;   INR: 1.00          PTT - ( 24 Mar 2018 15:39 )  PTT:59.4 sec            LIVER FUNCTIONS - ( 23 Mar 2018 14:35 )  Alb: 3.9 g/dL / Pro: 7.9 g/dL / ALK PHOS: 130 U/L / ALT: 16 U/L / AST: 21 U/L / GGT: x           I&O's Detail    24 Mar 2018 07:01  -  25 Mar 2018 07:00  --------------------------------------------------------  IN:    heparin  Infusion.: 30 mL    Oral Fluid: 430 mL  Total IN: 460 mL    OUT:    Voided: 1050 mL  Total OUT: 1050 mL    Total NET: -590 mL      25 Mar 2018 07:01  -  25 Mar 2018 11:27  --------------------------------------------------------  IN:  Total IN: 0 mL    OUT:    Voided: 1150 mL  Total OUT: 1150 mL    Total NET: -1150 mL            Additional tests & radiology reviewed. Pt examined at bedside.   Complained of pain on leg, chest, back and thigh, no new weakness/numbness    V/S    T(F): 98.4 (03-25-18 @ 05:12), Max: 98.4 (03-25-18 @ 05:12)  HR: 68 (03-25-18 @ 11:20)  BP: 110/44 (03-25-18 @ 11:20)  RR: 19 (03-25-18 @ 11:20)  SpO2: 95% (03-25-18 @ 11:20)  Wt(kg): --  PE     GEN - Appears stated age. No distress.           HEENT - NCAT, EOMI, PERRLA, MMM, facial asymmetry due to missing front dentition           CVS - RRR, no M/R/G, no JVD           PULM - bibasiler crackles           ABD - Soft, nontender, nondistended, normal BS           EXT - Distal extremities warm, no cyanosis, 1+ pitting edema on ankles bilaterally. Tender to touch on LEs. Some vesicles on anterior left upper thigh.           NEURO - AOx3, Moves all extremities.            Rectal - good rectal tone, no perianal numbness  	    LAB                        11.8   5.3   )-----------( 236      ( 25 Mar 2018 06:10 )             35.2               03-25    130<L>  |  92<L>  |  14  ----------------------------<  91  3.8   |  26  |  0.95    Ca    9.0      25 Mar 2018 06:10  Mg     1.8     03-25    TPro  7.9  /  Alb  3.9  /  TBili  0.4  /  DBili  x   /  AST  21  /  ALT  16  /  AlkPhos  130<H>  03-23              PT/INR - ( 23 Mar 2018 14:35 )   PT: 11.0 sec;   INR: 1.00          PTT - ( 24 Mar 2018 15:39 )  PTT:59.4 sec            LIVER FUNCTIONS - ( 23 Mar 2018 14:35 )  Alb: 3.9 g/dL / Pro: 7.9 g/dL / ALK PHOS: 130 U/L / ALT: 16 U/L / AST: 21 U/L / GGT: x           I&O's Detail    24 Mar 2018 07:01  -  25 Mar 2018 07:00  --------------------------------------------------------  IN:    heparin  Infusion.: 30 mL    Oral Fluid: 430 mL  Total IN: 460 mL    OUT:    Voided: 1050 mL  Total OUT: 1050 mL    Total NET: -590 mL      25 Mar 2018 07:01  -  25 Mar 2018 11:27  --------------------------------------------------------  IN:  Total IN: 0 mL    OUT:    Voided: 1150 mL  Total OUT: 1150 mL    Total NET: -1150 mL            Additional tests & radiology reviewed.

## 2018-03-25 NOTE — PROGRESS NOTE ADULT - PROBLEM SELECTOR PLAN 10
Keep K>4< Mg>2, replete PRN  DASH diet  no fluids Keep K>4< Mg>2, replete PRN  DASH diet  no fluids  DISPO: pending PT and nuclear stress test.

## 2018-03-25 NOTE — PROGRESS NOTE ADULT - PROBLEM SELECTOR PLAN 1
PResented. She thinks that she may have lost consciousness. Differential includes vasovagal syncope, cardiogenic syncope, neurogenic syncope.   Unlikely neurogenic syncope as no seizure like activity. Now back to baseline  Possibly cardiogenic in nature as she did have positive troponins and also possibly vasovagal in nature as she had episode after getting up quickly.   Will continue to monitor on Tele  Echo in AM   Follow up EKGS Presented. She thinks that she may have lost consciousness. Differential includes vasovagal syncope, cardiogenic syncope, neurogenic syncope.   Unlikely neurogenic syncope as no seizure like activity. Now back to baseline  Possibly cardiogenic in nature as she did have positive troponins and also possibly vasovagal in nature as she had episode after getting up quickly.   Will continue to monitor on Tele  F/u with orthostatic  Echo in AM   Follow up EKGS

## 2018-03-25 NOTE — PROGRESS NOTE ADULT - PROBLEM SELECTOR PLAN 8
Patient with few day history of left lateral thigh and gluteal rash, very suspicious for shingles.   Start Valacyclovir 1000mg tid for 7 days   Start Gabapentin 100tid for neuropathic pain, titrate up as tolerated. Patient with few day history of left lateral thigh and gluteal rash, very suspicious for shingles.   Cont Valacyclovir 1000mg tid for 7 days (3/24-)  Cont Gabapentin 100tid for neuropathic pain, titrate up as tolerated.

## 2018-03-25 NOTE — PROGRESS NOTE ADULT - PROBLEM SELECTOR PLAN 3
Could be CHF however pro-BNP is normal, could be dependent edema and atelectasis. Patient neg 2.5L since admission, Lasix 40mg IVx2.  Patient appeared more euvolemic today, switched to 20mg IV PO.  Strict I/O  F/u with ECHO.

## 2018-03-26 LAB
ANION GAP SERPL CALC-SCNC: 13 MMOL/L — SIGNIFICANT CHANGE UP (ref 5–17)
BUN SERPL-MCNC: 16 MG/DL — SIGNIFICANT CHANGE UP (ref 7–23)
CALCIUM SERPL-MCNC: 9.3 MG/DL — SIGNIFICANT CHANGE UP (ref 8.4–10.5)
CHLORIDE SERPL-SCNC: 93 MMOL/L — LOW (ref 96–108)
CHOLEST SERPL-MCNC: 153 MG/DL — SIGNIFICANT CHANGE UP (ref 10–199)
CO2 SERPL-SCNC: 26 MMOL/L — SIGNIFICANT CHANGE UP (ref 22–31)
CREAT SERPL-MCNC: 1.03 MG/DL — SIGNIFICANT CHANGE UP (ref 0.5–1.3)
GLUCOSE SERPL-MCNC: 107 MG/DL — HIGH (ref 70–99)
HCT VFR BLD CALC: 38.5 % — SIGNIFICANT CHANGE UP (ref 34.5–45)
HDLC SERPL-MCNC: 58 MG/DL — SIGNIFICANT CHANGE UP (ref 40–125)
HGB BLD-MCNC: 13.1 G/DL — SIGNIFICANT CHANGE UP (ref 11.5–15.5)
LIPID PNL WITH DIRECT LDL SERPL: 75 MG/DL — SIGNIFICANT CHANGE UP
MAGNESIUM SERPL-MCNC: 1.9 MG/DL — SIGNIFICANT CHANGE UP (ref 1.6–2.6)
MCHC RBC-ENTMCNC: 33.2 PG — SIGNIFICANT CHANGE UP (ref 27–34)
MCHC RBC-ENTMCNC: 34 G/DL — SIGNIFICANT CHANGE UP (ref 32–36)
MCV RBC AUTO: 97.7 FL — SIGNIFICANT CHANGE UP (ref 80–100)
PLATELET # BLD AUTO: 235 K/UL — SIGNIFICANT CHANGE UP (ref 150–400)
POTASSIUM SERPL-MCNC: 3.8 MMOL/L — SIGNIFICANT CHANGE UP (ref 3.5–5.3)
POTASSIUM SERPL-SCNC: 3.8 MMOL/L — SIGNIFICANT CHANGE UP (ref 3.5–5.3)
RBC # BLD: 3.94 M/UL — SIGNIFICANT CHANGE UP (ref 3.8–5.2)
RBC # FLD: 12.1 % — SIGNIFICANT CHANGE UP (ref 10.3–16.9)
SODIUM SERPL-SCNC: 132 MMOL/L — LOW (ref 135–145)
TOTAL CHOLESTEROL/HDL RATIO MEASUREMENT: 2.6 RATIO — LOW (ref 3.3–7.1)
TRIGL SERPL-MCNC: 100 MG/DL — SIGNIFICANT CHANGE UP (ref 10–149)
WBC # BLD: 5.3 K/UL — SIGNIFICANT CHANGE UP (ref 3.8–10.5)
WBC # FLD AUTO: 5.3 K/UL — SIGNIFICANT CHANGE UP (ref 3.8–10.5)

## 2018-03-26 PROCEDURE — 93016 CV STRESS TEST SUPVJ ONLY: CPT

## 2018-03-26 PROCEDURE — 78452 HT MUSCLE IMAGE SPECT MULT: CPT | Mod: 26

## 2018-03-26 PROCEDURE — 93018 CV STRESS TEST I&R ONLY: CPT

## 2018-03-26 PROCEDURE — 71045 X-RAY EXAM CHEST 1 VIEW: CPT | Mod: 26

## 2018-03-26 RX ORDER — SODIUM CHLORIDE 9 MG/ML
250 INJECTION INTRAMUSCULAR; INTRAVENOUS; SUBCUTANEOUS ONCE
Qty: 0 | Refills: 0 | Status: COMPLETED | OUTPATIENT
Start: 2018-03-26 | End: 2018-03-26

## 2018-03-26 RX ORDER — LIDOCAINE 4 G/100G
1 CREAM TOPICAL EVERY 24 HOURS
Qty: 0 | Refills: 0 | Status: DISCONTINUED | OUTPATIENT
Start: 2018-03-26 | End: 2018-03-27

## 2018-03-26 RX ORDER — PANTOPRAZOLE SODIUM 20 MG/1
1 TABLET, DELAYED RELEASE ORAL
Qty: 30 | Refills: 0 | OUTPATIENT
Start: 2018-03-26 | End: 2018-04-24

## 2018-03-26 RX ORDER — VALACYCLOVIR 500 MG/1
0 TABLET, FILM COATED ORAL
Qty: 21 | Refills: 0 | COMMUNITY

## 2018-03-26 RX ORDER — LIDOCAINE 4 G/100G
1 CREAM TOPICAL
Qty: 30 | Refills: 0 | OUTPATIENT
Start: 2018-03-26 | End: 2018-04-24

## 2018-03-26 RX ORDER — PREGABALIN 225 MG/1
1 CAPSULE ORAL
Qty: 0 | Refills: 0 | COMMUNITY

## 2018-03-26 RX ORDER — TRAMADOL HYDROCHLORIDE 50 MG/1
25 TABLET ORAL EVERY 6 HOURS
Qty: 0 | Refills: 0 | Status: DISCONTINUED | OUTPATIENT
Start: 2018-03-26 | End: 2018-03-27

## 2018-03-26 RX ORDER — CHOLECALCIFEROL (VITAMIN D3) 125 MCG
1 CAPSULE ORAL
Qty: 0 | Refills: 0 | COMMUNITY

## 2018-03-26 RX ORDER — GABAPENTIN 400 MG/1
1 CAPSULE ORAL
Qty: 90 | Refills: 0 | OUTPATIENT
Start: 2018-03-26 | End: 2018-04-24

## 2018-03-26 RX ORDER — ASPIRIN/CALCIUM CARB/MAGNESIUM 324 MG
1 TABLET ORAL
Qty: 0 | Refills: 0 | COMMUNITY
Start: 2018-03-26

## 2018-03-26 RX ADMIN — Medication 650 MILLIGRAM(S): at 19:33

## 2018-03-26 RX ADMIN — Medication 20 MILLIGRAM(S): at 06:51

## 2018-03-26 RX ADMIN — ATORVASTATIN CALCIUM 40 MILLIGRAM(S): 80 TABLET, FILM COATED ORAL at 21:45

## 2018-03-26 RX ADMIN — Medication 81 MILLIGRAM(S): at 10:04

## 2018-03-26 RX ADMIN — HEPARIN SODIUM 5000 UNIT(S): 5000 INJECTION INTRAVENOUS; SUBCUTANEOUS at 13:04

## 2018-03-26 RX ADMIN — Medication 1 CAPSULE(S): at 21:46

## 2018-03-26 RX ADMIN — SODIUM CHLORIDE 1000 MILLILITER(S): 9 INJECTION INTRAMUSCULAR; INTRAVENOUS; SUBCUTANEOUS at 11:17

## 2018-03-26 RX ADMIN — HEPARIN SODIUM 5000 UNIT(S): 5000 INJECTION INTRAVENOUS; SUBCUTANEOUS at 21:44

## 2018-03-26 RX ADMIN — OXYCODONE AND ACETAMINOPHEN 1 TABLET(S): 5; 325 TABLET ORAL at 08:07

## 2018-03-26 RX ADMIN — GABAPENTIN 100 MILLIGRAM(S): 400 CAPSULE ORAL at 13:04

## 2018-03-26 RX ADMIN — AMLODIPINE BESYLATE 10 MILLIGRAM(S): 2.5 TABLET ORAL at 06:51

## 2018-03-26 RX ADMIN — HEPARIN SODIUM 5000 UNIT(S): 5000 INJECTION INTRAVENOUS; SUBCUTANEOUS at 06:50

## 2018-03-26 RX ADMIN — VALACYCLOVIR 1000 MILLIGRAM(S): 500 TABLET, FILM COATED ORAL at 06:50

## 2018-03-26 RX ADMIN — SODIUM CHLORIDE 1000 MILLILITER(S): 9 INJECTION INTRAMUSCULAR; INTRAVENOUS; SUBCUTANEOUS at 12:29

## 2018-03-26 RX ADMIN — VALACYCLOVIR 1000 MILLIGRAM(S): 500 TABLET, FILM COATED ORAL at 13:04

## 2018-03-26 RX ADMIN — Medication 1 CAPSULE(S): at 22:15

## 2018-03-26 RX ADMIN — OXYCODONE AND ACETAMINOPHEN 1 TABLET(S): 5; 325 TABLET ORAL at 07:05

## 2018-03-26 RX ADMIN — GABAPENTIN 100 MILLIGRAM(S): 400 CAPSULE ORAL at 21:44

## 2018-03-26 RX ADMIN — Medication 1 TABLET(S): at 10:07

## 2018-03-26 RX ADMIN — PANTOPRAZOLE SODIUM 40 MILLIGRAM(S): 20 TABLET, DELAYED RELEASE ORAL at 06:51

## 2018-03-26 RX ADMIN — Medication 50 MICROGRAM(S): at 06:51

## 2018-03-26 RX ADMIN — LIDOCAINE 1 PATCH: 4 CREAM TOPICAL at 21:43

## 2018-03-26 RX ADMIN — GABAPENTIN 100 MILLIGRAM(S): 400 CAPSULE ORAL at 06:51

## 2018-03-26 RX ADMIN — Medication 650 MILLIGRAM(S): at 20:11

## 2018-03-26 RX ADMIN — Medication 1000 UNIT(S): at 10:07

## 2018-03-26 RX ADMIN — PREGABALIN 1000 MICROGRAM(S): 225 CAPSULE ORAL at 10:07

## 2018-03-26 RX ADMIN — VALACYCLOVIR 1000 MILLIGRAM(S): 500 TABLET, FILM COATED ORAL at 21:43

## 2018-03-26 NOTE — PHYSICAL THERAPY INITIAL EVALUATION ADULT - ADDITIONAL COMMENTS
Patient stated she lives alone in [facility] apartment carries out all ADL's and iADL's with independence discussed she would be able to arrange for HHA for 4 hours a day, her son is flying in from [place] today and will be staying for a week. Straight cane bedside, shower chair and grab bars at home.

## 2018-03-26 NOTE — PROGRESS NOTE ADULT - PROBLEM SELECTOR PLAN 4
Continue on Amlodipine 10mg daily Continue on Amlodipine 10mg daily  -careful not to drop BP too much as orthostatic

## 2018-03-26 NOTE — DISCHARGE NOTE ADULT - MEDICATION SUMMARY - MEDICATIONS TO TAKE
I will START or STAY ON the medications listed below when I get home from the hospital:    Fioricet oral tablet  -- 1 tab(s) by mouth once a day, As Needed  -- Indication: For Headache    Aspi-Cor 81 mg oral delayed release tablet  -- 1 tab(s) by mouth once a day  -- Indication: For prevent heart disease    ATORVASTATIN TAB 10MG  -- Indication: For Lower cholesterol    AMLODIPINE   TAB 10MG  -- Indication: For Blood pressure    LANSOPRAZOLE CAP 30MG DR  -- Indication: For prevent gastric reflux    LEVOTHYROXIN TAB 50MCG  -- Indication: For thyroid    Calcium 600+D oral tablet  -- 1 tab(s) by mouth once a day  -- Indication: For Calcium vitamin D    Eligen B12 1000 mcg oral tablet  -- 1 tab(s) by mouth once a day  -- Indication: For vitamin B12    cholecalciferol 1000 intl units oral capsule  -- 1 cap(s) by mouth once a day  -- Indication: For vitamin D I will START or STAY ON the medications listed below when I get home from the hospital:    Fioricet oral tablet  -- 1 tab(s) by mouth once a day, As Needed  -- Indication: For Headache    Aspi-Cor 81 mg oral delayed release tablet  -- 1 tab(s) by mouth once a day  -- Indication: For prevent heart disease     Neurontin 100 mg oral capsule  -- 1 cap(s) by mouth 3 times a day  -- Indication: For Neuropathic right right thigh    ATORVASTATIN TAB 10MG  -- orally once a day  -- Indication: For reduce cholesterol    AMLODIPINE   TAB 10MG  -- orally once a day  -- Indication: For Blood pressure    Lidoderm 5% topical film  -- Apply on skin to affected area once a day   -- Indication: For pain    LANSOPRAZOLE CAP 30MG DR  -- orally once a day  -- Indication: For prevent gastric reflux     LEVOTHYROXIN TAB 50MCG  -- orally once a day  -- Indication: For thyroid     Calcium 600+D oral tablet  -- 1 tab(s) by mouth once a day  -- Indication: For Calcium vitamin D    Eligen B12 1000 mcg oral tablet  -- 1 tab(s) by mouth once a day  -- Indication: For vitamin B12    cholecalciferol 1000 intl units oral capsule  -- 1 cap(s) by mouth once a day  -- Indication: For vitamin D

## 2018-03-26 NOTE — CHART NOTE - NSCHARTNOTEFT_GEN_A_CORE
Checked orthostatic VS    Laying down: 111/55, HR 76  Sittin/60, 75  Standin/59, HR 91    d/c lasix 20 and give 250cc NS bolus

## 2018-03-26 NOTE — DISCHARGE NOTE ADULT - CARE PLAN
Principal Discharge DX:	Syncope  Goal:	Follow-up with Dr. Cornell'  Assessment and plan of treatment:	You were admitted for an episode of fainting to rule out an underlying cardiologic etiology. You had no chest pain, but your cardiac markers were elevated so you had a pharmacologic stress test to rule out underlying cardiac disease. The fainting may have been due to low blood pressure when standing as we measured here. Follow-up as an outpatient for further work up.  Secondary Diagnosis:	Fluid overload  Assessment and plan of treatment:	You had some swelling in your legs so you were started on diuretic, but then your blood pressure dropped when standing.  Secondary Diagnosis:	HTN (hypertension)  Secondary Diagnosis:	Shingles  Secondary Diagnosis:	Orthostatic hypotension Principal Discharge DX:	Syncope  Goal:	Follow-up with Dr. Cornell'  Assessment and plan of treatment:	You were admitted for an episode of fainting to rule out an underlying cardiologic etiology. You had no chest pain, but your cardiac markers were elevated so you had a pharmacologic stress test to rule out underlying cardiac disease. The fainting may have been due to low blood pressure when standing as we measured here. Follow-up as an outpatient for further work up.  Secondary Diagnosis:	Fluid overload  Assessment and plan of treatment:	You had some swelling in your legs so you were started on diuretic, but then your blood pressure dropped when standing. We gave you back some fluids and then were able to stand without dropping your blood pressure.  Secondary Diagnosis:	HTN (hypertension)  Assessment and plan of treatment:	continue home blood pressure medications  Secondary Diagnosis:	Shingles  Assessment and plan of treatment:	you finished your course of anti-virals for your herpes.   Continue the gabapentin for the pain from herpes  Secondary Diagnosis:	Orthostatic hypotension  Assessment and plan of treatment:	see above.

## 2018-03-26 NOTE — DISCHARGE NOTE ADULT - ADDITIONAL INSTRUCTIONS
Dr. Cornell's office will call contact you for follow up information. If you have not heard back within 1 week, please call the office . The address is listed below.

## 2018-03-26 NOTE — DISCHARGE NOTE ADULT - HOSPITAL COURSE
82/F PMHx sciatica, hypothyroidism, HTN, who presents to [facility] after syncopal episode at home. She states that she got up from her couch to change the channel on the TV and then she stated that she fell. She says she thinks she lost consciousness but is not sure. She states that she had no confusion afterwards, crawled to her bed and called EMS. She has never fainted before. She has chronic back pain that has been worse recently. No palpitations, never had chest pain. Has not had cardiac work up in years. Patient admitted to [facility] to rule out cardiac etiology of syncope.    ECG showed normal sinus with 1st degree AV block with moderate criteria for LVH. Troponin 0.017 then peaked 0.059. CT head without acute ischemic changes. CT spine showing mild compression fracture of L1 of indeterminate age. Echocardiogram showing _______________________________. Patient was diursed due to chronic LE edema, but then became orthostatic and was given fluids which resolved the orthostatic hypotension. Patient had a Lexiscan, ECG stress test and myocardial perfusion scan which were normal. Patient will get PT services at home. Etiology of syncope could be non-cardiac and patient should follow-up as outpatient. CXR of right knee to rule out fracture. Patient stable for discharge with outpatient follow-up. 82/F PMHx sciatica, hypothyroidism, HTN, who presents to [facility] after syncopal episode at home. She states that she got up from her couch to change the channel on the TV and then she stated that she fell. She says she thinks she lost consciousness but is not sure. She states that she had no confusion afterwards, crawled to her bed and called EMS. She has never fainted before. She has chronic back pain that has been worse recently. No palpitations, never had chest pain. Has not had cardiac work up in years. Patient admitted to [facility] to rule out cardiac etiology of syncope.    ECG showed normal sinus with 1st degree AV block with moderate criteria for LVH. Troponin 0.017 then peaked 0.059. CT head without acute ischemic changes. CT spine showing mild compression fracture of L1 of indeterminate age. Echocardiogram showing 75% EF without left wall motion abnormalities, and mild left ventricular concentric hypertrophy. Patient was diursed due to chronic LE edema, but then became orthostatic and was given fluids which resolved the orthostatic hypotension. Patient had a Lexiscan, ECG stress test and myocardial perfusion scan which were normal. Patient will get PT services at home. Etiology of syncope could be non-cardiac and patient should follow-up as outpatient. CXR of right knee to rule out fracture. Patient stable for discharge with outpatient follow-up.

## 2018-03-26 NOTE — PROGRESS NOTE ADULT - PROBLEM SELECTOR PLAN 8
Patient with few day history of left lateral thigh and gluteal rash, very suspicious for shingles.   Cont Valacyclovir 1000mg tid for 7 days (3/24-)  Cont Gabapentin 100tid for neuropathic pain, titrate up as tolerated.

## 2018-03-26 NOTE — PHYSICAL THERAPY INITIAL EVALUATION ADULT - PERTINENT HX OF CURRENT PROBLEM, REHAB EVAL
81 yo female presents following syncopal episode at home, CT lumbar spine with Mild compression fracture of the L1 vertebral body, of undetermined age, CT head negative, cleared to participate with PT by BRODY Gutierrez

## 2018-03-26 NOTE — DISCHARGE NOTE ADULT - ABILITY TO HEAR (WITH HEARING AID OR HEARING APPLIANCE IF NORMALLY USED):
Mildly to Moderately Impaired: difficulty hearing in some environments or speaker may need to increase volume or speak distinctly/chandrakant. hearing aides

## 2018-03-26 NOTE — PROGRESS NOTE ADULT - PROBLEM SELECTOR PLAN 2
Non-anginal chest pain. Patient with rising troponins, no ST-T wave changes on EKG. Reported some non-specific chest pain, doubt ACS.  Hold of heparin drip.  Increase to Lipitor 40mg.  F/u with Echo  Lexiscan on Monday. Non-anginal chest pain. Patient with rising troponins, no ST-T wave changes on EKG. Reported some non-specific chest pain, doubt ACS.  Hold of heparin drip.  Increase to Lipitor 40mg.  F/u with Echo  Lexiscan 3/26

## 2018-03-26 NOTE — DISCHARGE NOTE ADULT - MEDICATION SUMMARY - MEDICATIONS TO STOP TAKING
I will STOP taking the medications listed below when I get home from the hospital:    VALACYCLOVIR TAB 1GM

## 2018-03-26 NOTE — DISCHARGE NOTE ADULT - PLAN OF CARE
You had some swelling in your legs so you were started on diuretic, but then your blood pressure dropped when standing. Follow-up with Dr. Cornell' You were admitted for an episode of fainting to rule out an underlying cardiologic etiology. You had no chest pain, but your cardiac markers were elevated so you had a pharmacologic stress test to rule out underlying cardiac disease. The fainting may have been due to low blood pressure when standing as we measured here. Follow-up as an outpatient for further work up. You had some swelling in your legs so you were started on diuretic, but then your blood pressure dropped when standing. We gave you back some fluids and then were able to stand without dropping your blood pressure. continue home blood pressure medications you finished your course of anti-virals for your herpes.   Continue the gabapentin for the pain from herpes see above.

## 2018-03-26 NOTE — PROGRESS NOTE ADULT - PROBLEM SELECTOR PLAN 1
Presented. She thinks that she may have lost consciousness. Differential includes vasovagal syncope, cardiogenic syncope, neurogenic syncope.   Unlikely neurogenic syncope as no seizure like activity. Now back to baseline  Possibly cardiogenic in nature as she did have positive troponins and also possibly vasovagal in nature as she had episode after getting up quickly.   Will continue to monitor on Tele  F/u with orthostatic  Echo in AM   Follow up EKGS Presented. She thinks that she may have lost consciousness. Differential includes vasovagal syncope, cardiogenic syncope, neurogenic syncope.   Unlikely neurogenic syncope as no seizure like activity. Likely orthostatic, which with drop off BP from 111 systolic to 85 from standing to sitting.   Possibly cardiogenic in nature as she did have positive troponins and also possibly vasovagal in nature as she had episode after getting up quickly.   Will continue to monitor on Tele  Echo in AM   Follow up EKGS  f/u dipyridamole stress test

## 2018-03-26 NOTE — PHYSICAL THERAPY INITIAL EVALUATION ADULT - CRITERIA FOR SKILLED THERAPEUTIC INTERVENTIONS
anticipated discharge recommendation/rehab potential/risk reduction/prevention/functional limitations in following categories/therapy frequency/impairments found

## 2018-03-26 NOTE — DISCHARGE NOTE ADULT - PATIENT PORTAL LINK FT
You can access the [facility], offered by [facility], by registering with the following website: [email or web address]

## 2018-03-26 NOTE — DISCHARGE NOTE ADULT - CARE PROVIDER_API CALL
Zainab Cornell), Cardiovascular Disease; Internal Medicine  [address] [id number]  Phone: [phone]  Fax: [phone]

## 2018-03-26 NOTE — PROGRESS NOTE ADULT - PROBLEM SELECTOR PLAN 5
Chronic lower back/sciatica pain, not worsened today. CT lumbar showed mild L1 compression fracture, likely chronic since no worsening pain.   No signs of neurological impairment, reflexes intact, good rectal tone.  Percocet for moderate pain. Chronic lower back/sciatica pain, not worsened today. CT lumbar showed mild L1 compression fracture, likely chronic since no worsening pain.   No signs of neurological impairment, reflexes intact, good rectal tone.  -lidocaine patch   -tramadol for moderate pain

## 2018-03-26 NOTE — PROGRESS NOTE ADULT - PROBLEM SELECTOR PLAN 3
Could be CHF however pro-BNP is normal, could be dependent edema and atelectasis. Patient neg 2.5L since admission, Lasix 40mg IVx2.  Patient appeared more euvolemic today, switched to 20mg IV PO.  Strict I/O  F/u with ECHO. Could be CHF however pro-BNP is normal, could be dependent edema and atelectasis. Patient neg 2.5L since admission from addition to 3/25, but now orthostatic.   -stop lasix, s/p 500cc NS, will recheck orthostatics   Strict I/O  F/u with ECHO.

## 2018-03-27 VITALS
SYSTOLIC BLOOD PRESSURE: 154 MMHG | DIASTOLIC BLOOD PRESSURE: 62 MMHG | RESPIRATION RATE: 18 BRPM | HEART RATE: 78 BPM | OXYGEN SATURATION: 96 %

## 2018-03-27 PROCEDURE — 80053 COMPREHEN METABOLIC PANEL: CPT

## 2018-03-27 PROCEDURE — 81001 URINALYSIS AUTO W/SCOPE: CPT

## 2018-03-27 PROCEDURE — A9500: CPT

## 2018-03-27 PROCEDURE — 93017 CV STRESS TEST TRACING ONLY: CPT

## 2018-03-27 PROCEDURE — 85610 PROTHROMBIN TIME: CPT

## 2018-03-27 PROCEDURE — 82962 GLUCOSE BLOOD TEST: CPT

## 2018-03-27 PROCEDURE — 71045 X-RAY EXAM CHEST 1 VIEW: CPT

## 2018-03-27 PROCEDURE — 96374 THER/PROPH/DIAG INJ IV PUSH: CPT

## 2018-03-27 PROCEDURE — 72131 CT LUMBAR SPINE W/O DYE: CPT

## 2018-03-27 PROCEDURE — 83735 ASSAY OF MAGNESIUM: CPT

## 2018-03-27 PROCEDURE — 82550 ASSAY OF CK (CPK): CPT

## 2018-03-27 PROCEDURE — 85730 THROMBOPLASTIN TIME PARTIAL: CPT

## 2018-03-27 PROCEDURE — 93306 TTE W/DOPPLER COMPLETE: CPT | Mod: 26

## 2018-03-27 PROCEDURE — 84443 ASSAY THYROID STIM HORMONE: CPT

## 2018-03-27 PROCEDURE — 93306 TTE W/DOPPLER COMPLETE: CPT

## 2018-03-27 PROCEDURE — 84484 ASSAY OF TROPONIN QUANT: CPT

## 2018-03-27 PROCEDURE — 78452 HT MUSCLE IMAGE SPECT MULT: CPT

## 2018-03-27 PROCEDURE — 73562 X-RAY EXAM OF KNEE 3: CPT

## 2018-03-27 PROCEDURE — 83036 HEMOGLOBIN GLYCOSYLATED A1C: CPT

## 2018-03-27 PROCEDURE — 85025 COMPLETE CBC W/AUTO DIFF WBC: CPT

## 2018-03-27 PROCEDURE — 80061 LIPID PANEL: CPT

## 2018-03-27 PROCEDURE — A9505: CPT

## 2018-03-27 PROCEDURE — 87086 URINE CULTURE/COLONY COUNT: CPT

## 2018-03-27 PROCEDURE — 73562 X-RAY EXAM OF KNEE 3: CPT | Mod: 26,50

## 2018-03-27 PROCEDURE — 96375 TX/PRO/DX INJ NEW DRUG ADDON: CPT

## 2018-03-27 PROCEDURE — 99285 EMERGENCY DEPT VISIT HI MDM: CPT | Mod: 25

## 2018-03-27 PROCEDURE — 93005 ELECTROCARDIOGRAM TRACING: CPT | Mod: 76

## 2018-03-27 PROCEDURE — 80048 BASIC METABOLIC PNL TOTAL CA: CPT

## 2018-03-27 PROCEDURE — 83880 ASSAY OF NATRIURETIC PEPTIDE: CPT

## 2018-03-27 PROCEDURE — 85027 COMPLETE CBC AUTOMATED: CPT

## 2018-03-27 PROCEDURE — 36415 COLL VENOUS BLD VENIPUNCTURE: CPT

## 2018-03-27 PROCEDURE — 82553 CREATINE MB FRACTION: CPT

## 2018-03-27 PROCEDURE — G0378: CPT

## 2018-03-27 PROCEDURE — 97163 PT EVAL HIGH COMPLEX 45 MIN: CPT

## 2018-03-27 PROCEDURE — 70450 CT HEAD/BRAIN W/O DYE: CPT

## 2018-03-27 RX ORDER — LANSOPRAZOLE 15 MG/1
0 CAPSULE, DELAYED RELEASE ORAL
Qty: 90 | Refills: 0 | COMMUNITY

## 2018-03-27 RX ORDER — AMLODIPINE BESYLATE 2.5 MG/1
0 TABLET ORAL
Qty: 30 | Refills: 0 | COMMUNITY

## 2018-03-27 RX ORDER — ATORVASTATIN CALCIUM 80 MG/1
0 TABLET, FILM COATED ORAL
Qty: 90 | Refills: 0 | COMMUNITY

## 2018-03-27 RX ORDER — LEVOTHYROXINE SODIUM 125 MCG
0 TABLET ORAL
Qty: 90 | Refills: 0 | COMMUNITY

## 2018-03-27 RX ORDER — ASPIRIN/CALCIUM CARB/MAGNESIUM 324 MG
1 TABLET ORAL
Qty: 30 | Refills: 0 | OUTPATIENT
Start: 2018-03-27 | End: 2018-04-25

## 2018-03-27 RX ADMIN — HEPARIN SODIUM 5000 UNIT(S): 5000 INJECTION INTRAVENOUS; SUBCUTANEOUS at 13:38

## 2018-03-27 RX ADMIN — PANTOPRAZOLE SODIUM 40 MILLIGRAM(S): 20 TABLET, DELAYED RELEASE ORAL at 06:34

## 2018-03-27 RX ADMIN — TRAMADOL HYDROCHLORIDE 25 MILLIGRAM(S): 50 TABLET ORAL at 06:34

## 2018-03-27 RX ADMIN — GABAPENTIN 100 MILLIGRAM(S): 400 CAPSULE ORAL at 06:34

## 2018-03-27 RX ADMIN — HEPARIN SODIUM 5000 UNIT(S): 5000 INJECTION INTRAVENOUS; SUBCUTANEOUS at 06:34

## 2018-03-27 RX ADMIN — Medication 1000 UNIT(S): at 13:38

## 2018-03-27 RX ADMIN — TRAMADOL HYDROCHLORIDE 25 MILLIGRAM(S): 50 TABLET ORAL at 14:07

## 2018-03-27 RX ADMIN — AMLODIPINE BESYLATE 10 MILLIGRAM(S): 2.5 TABLET ORAL at 06:34

## 2018-03-27 RX ADMIN — VALACYCLOVIR 1000 MILLIGRAM(S): 500 TABLET, FILM COATED ORAL at 06:34

## 2018-03-27 RX ADMIN — VALACYCLOVIR 1000 MILLIGRAM(S): 500 TABLET, FILM COATED ORAL at 13:37

## 2018-03-27 RX ADMIN — Medication 1 TABLET(S): at 13:38

## 2018-03-27 RX ADMIN — PREGABALIN 1000 MICROGRAM(S): 225 CAPSULE ORAL at 13:38

## 2018-03-27 RX ADMIN — LIDOCAINE 1 PATCH: 4 CREAM TOPICAL at 09:51

## 2018-03-27 RX ADMIN — Medication 81 MILLIGRAM(S): at 13:38

## 2018-03-27 RX ADMIN — TRAMADOL HYDROCHLORIDE 25 MILLIGRAM(S): 50 TABLET ORAL at 07:16

## 2018-03-27 RX ADMIN — GABAPENTIN 100 MILLIGRAM(S): 400 CAPSULE ORAL at 13:38

## 2018-03-27 RX ADMIN — Medication 50 MICROGRAM(S): at 06:34

## 2018-03-27 RX ADMIN — TRAMADOL HYDROCHLORIDE 25 MILLIGRAM(S): 50 TABLET ORAL at 13:37

## 2018-03-27 NOTE — PROGRESS NOTE ADULT - PROBLEM SELECTOR PLAN 1
Presented. She thinks that she may have lost consciousness. Differential includes vasovagal syncope, cardiogenic syncope, neurogenic syncope.   Unlikely neurogenic syncope as no seizure like activity. Likely orthostatic, which with drop off BP from 111 systolic to 85 from standing to sitting.   Possibly cardiogenic in nature as she did have positive troponins and also possibly vasovagal in nature as she had episode after getting up quickly.   Will continue to monitor on Tele  Echo in AM   Follow up EKGS  f/u dipyridamole stress test

## 2018-03-27 NOTE — PROGRESS NOTE ADULT - PROBLEM SELECTOR PLAN 5
Chronic lower back/sciatica pain, not worsened today. CT lumbar showed mild L1 compression fracture, likely chronic since no worsening pain.   No signs of neurological impairment, reflexes intact, good rectal tone.  -lidocaine patch   -tramadol for moderate pain

## 2018-03-27 NOTE — PROGRESS NOTE ADULT - PROBLEM SELECTOR PLAN 2
Non-anginal chest pain. Patient with rising troponins, no ST-T wave changes on EKG. Reported some non-specific chest pain, doubt ACS.  Hold of heparin drip.  Increase to Lipitor 40mg.  F/u with Echo  Lexiscan 3/26

## 2018-03-27 NOTE — PROGRESS NOTE ADULT - ASSESSMENT
82/F PMHx sciatica, hypothyroidism, HTN, who presents to [facility] after syncopal episode at home.
82/F PMHx sciatica, hypothyroidism, HTN, who presents to [facility] after syncopal episode at home.
82/F PMHx sciatica, hypothyroidism, HTN, who presents to [facility] after syncopal episode at home.

## 2018-03-27 NOTE — PROGRESS NOTE ADULT - SUBJECTIVE AND OBJECTIVE BOX
INTERVAL HPI/OVERNIGHT EVENTS:    OVERNIGHT: No overnight events.  SUBJECTIVE: Patient seen and examined at bedside.     ROS:  CV: Denies chest pain  Resp: Denies SOB  GI: Denies abdominal pain, constipation, diarrhea, nausea, vomiting  : Denies dysuria  ID: Denies fevers, chills  MSK: Denies joint pain     OBJECTIVE:    VITAL SIGNS:  ICU Vital Signs Last 24 Hrs  T(C): 36.4 (27 Mar 2018 08:50), Max: 36.6 (27 Mar 2018 05:43)  T(F): 97.6 (27 Mar 2018 08:50), Max: 97.9 (27 Mar 2018 05:43)  HR: 66 (27 Mar 2018 06:24) (62 - 88)  BP: 154/59 (27 Mar 2018 06:24) (93/50 - 160/54)  BP(mean): 78 (27 Mar 2018 06:24) (64 - 96)  ABP: --  ABP(mean): --  RR: 14 (27 Mar 2018 06:24) (14 - 35)  SpO2: 97% (27 Mar 2018 06:24) (93% - 97%)        03-26 @ 07:01  -  03-27 @ 07:00  --------------------------------------------------------  IN: 500 mL / OUT: 1990 mL / NET: -1490 mL      CAPILLARY BLOOD GLUCOSE          PHYSICAL EXAM:    General: NAD, comfortable  HEENT: NCAT, PERRL, clear conjunctiva, no scleral icterus  Neck: supple, no JVD  Respiratory: CTA b/l, no wheezing, rhonchi, rales  Cardiovascular: RRR, normal S1S2, no M/R/G  Abdomen: soft, NT/ND, bowel sounds in all four quadrants, no palpable masses  Extremities: WWP, no clubbing, cyanosis, or edema  Neuro:     MEDICATIONS:  MEDICATIONS  (STANDING):  amLODIPine   Tablet 10 milliGRAM(s) Oral daily  aspirin enteric coated 81 milliGRAM(s) Oral daily  atorvastatin 40 milliGRAM(s) Oral at bedtime  calcium carbonate  625 mG + Vitamin D (OsCal 250 + D) 1 Tablet(s) Oral daily  cholecalciferol 1000 Unit(s) Oral daily  cyanocobalamin 1000 MICROGram(s) Oral daily  gabapentin 100 milliGRAM(s) Oral three times a day  heparin  Injectable 5000 Unit(s) SubCutaneous every 8 hours  levothyroxine 50 MICROGram(s) Oral daily  lidocaine   Patch 1 Patch Transdermal every 24 hours  pantoprazole    Tablet 40 milliGRAM(s) Oral before breakfast  valACYclovir 1000 milliGRAM(s) Oral three times a day    MEDICATIONS  (PRN):  acetaminophen   Tablet. 650 milliGRAM(s) Oral every 6 hours PRN Mild Pain (1 - 3)  traMADol 25 milliGRAM(s) Oral every 6 hours PRN Moderate Pain (4 - 6)      ALLERGIES:  Allergies    ACTH-80 (Other)  corticosteroids (Hives)  shellfish (Unknown)    Intolerances        LABS:                        13.1   5.3   )-----------( 235      ( 26 Mar 2018 07:05 )             38.5     03-26    132<L>  |  93<L>  |  16  ----------------------------<  107<H>  3.8   |  26  |  1.03    Ca    9.3      26 Mar 2018 07:06  Mg     1.9     03-26            RADIOLOGY & ADDITIONAL TESTS: Reviewed.

## 2018-03-27 NOTE — PROGRESS NOTE ADULT - PROBLEM SELECTOR PLAN 7
Long history of Migraines, Fioricet q6 PRN

## 2018-03-27 NOTE — PROGRESS NOTE ADULT - PROBLEM SELECTOR PLAN 3
Could be CHF however pro-BNP is normal, could be dependent edema and atelectasis. Patient neg 2.5L since admission from addition to 3/25, but now orthostatic.   -stop lasix, s/p 500cc NS, will recheck orthostatics   Strict I/O  F/u with ECHO.

## 2018-03-29 DIAGNOSIS — M48.56XA COLLAPSED VERTEBRA, NOT ELSEWHERE CLASSIFIED, LUMBAR REGION, INITIAL ENCOUNTER FOR FRACTURE: ICD-10-CM

## 2018-03-29 DIAGNOSIS — I95.1 ORTHOSTATIC HYPOTENSION: ICD-10-CM

## 2018-03-29 DIAGNOSIS — R55 SYNCOPE AND COLLAPSE: ICD-10-CM

## 2018-03-29 DIAGNOSIS — B02.9 ZOSTER WITHOUT COMPLICATIONS: ICD-10-CM

## 2018-03-29 DIAGNOSIS — G43.909 MIGRAINE, UNSPECIFIED, NOT INTRACTABLE, WITHOUT STATUS MIGRAINOSUS: ICD-10-CM

## 2018-03-29 DIAGNOSIS — E87.70 FLUID OVERLOAD, UNSPECIFIED: ICD-10-CM

## 2018-03-29 DIAGNOSIS — E03.9 HYPOTHYROIDISM, UNSPECIFIED: ICD-10-CM

## 2018-03-29 DIAGNOSIS — I10 ESSENTIAL (PRIMARY) HYPERTENSION: ICD-10-CM

## 2018-06-28 NOTE — ED CDU PROVIDER INITIAL DAY NOTE - NS ED MD EKG NOTE ADDITIONAL 2
Will be notified of pending x ray results.  Start PT.  Ice and Naproxen twice daily with food for the next 3 days.  Follow up if symptoms persist or worsen and as needed.        Thank you for choosing [facility].  You may be receiving a survey in the mail from Wilver Veronica regarding your visit today.  Please take a few minutes to complete and return the survey to let us know how we are doing.      Our Clinic hours are:  Mondays    7:20 am - 7 pm  Tues -  Fri  7:20 am - 5 pm    Clinic Phone: [phone]    The clinic lab opens at 7:30 am Mon - Fri and appointments are required.    [facility]  Ph. [phone]  Monday  8 am - 7pm  Tues - Fri 8 am - 5:30 pm         
Additional EKG Note...

## 2020-12-09 NOTE — PHYSICAL THERAPY INITIAL EVALUATION ADULT - GROSSLY INTACT, SENSORY
[facility]  Discharge Note  Short Stay    Admit Date: 12/9/2020    Discharge Date: 12/9/2020    Attending Physician: Dawit De La Garza     Discharge Provider: Dawit De La Garza    Diagnoses:  Active Hospital Problems    Diagnosis  POA    Lumbar radiculopathy [M54.16]  Yes      Resolved Hospital Problems   No resolved problems to display.       Discharged Condition: Good    Final Diagnoses: Lumbar radiculopathy [M54.16]    Disposition: Home or Self Care    Hospital Course: No complications, uneventful    Outcome of Hospitalization, Treatment, Procedure, or Surgery:  Patient was admitted for outpatient interventional pain management procedure. The patient tolerated the procedure well with no complications.    Follow up/Patient Instructions:  Follow up as scheduled in Pain Management office in 3-4 weeks.  Patient has received instructions and follow up date and time.    Medications:  Continue previous medications    Discharge Procedure Orders   Notify your health care provider if you experience any of the following:  temperature >100.4     Notify your health care provider if you experience any of the following:  persistent nausea and vomiting or diarrhea     Notify your health care provider if you experience any of the following:  severe uncontrolled pain     Notify your health care provider if you experience any of the following:  redness, tenderness, or signs of infection (pain, swelling, redness, odor or green/yellow discharge around incision site)     Notify your health care provider if you experience any of the following:  difficulty breathing or increased cough     Notify your health care provider if you experience any of the following:  severe persistent headache     Notify your health care provider if you experience any of the following:  worsening rash     Notify your health care provider if you experience any of the following:  persistent dizziness, light-headedness, or visual disturbances     Notify your  health care provider if you experience any of the following:  increased confusion or weakness     Activity as tolerated        Grossly Intact/Left UE/Right UE/Left LE/Right LE

## 2022-09-06 NOTE — PHYSICAL THERAPY INITIAL EVALUATION ADULT - ADL SKILLS, REHAB EVAL
Dexa scan reveals osteoporosis, she is a candidate for prolia   Please let her know and discuss with Netta Ball for arrangement
needs device

## 2022-09-12 NOTE — H&P ADULT - NSHPPHYSICALEXAM_GEN_ALL_CORE
VITAL SIGNS:  T(C): 36.7 (03-24-18 @ 00:11), Max: 37 (03-23-18 @ 21:20)  T(F): 98 (03-24-18 @ 00:11), Max: 98.6 (03-23-18 @ 21:20)  HR: 70 (03-24-18 @ 01:30) (70 - 83)  BP: 187/65 (03-24-18 @ 01:30) (145/65 - 200/80)  BP(mean): 107 (03-24-18 @ 01:30) (107 - 107)  RR: 16 (03-24-18 @ 01:30) (16 - 18)  SpO2: 96% (03-24-18 @ 01:30) (95% - 100%)  Wt(kg): --    PHYSICAL EXAM:    Constitutional: WDWN resting comfortably in bed; NAD  Eyes: PERRL, EOMI, clear conjunctiva  ENT: no nasal discharge; uvula midline, no oropharyngeal erythema or exudates; MMM  Neck: supple; no JVD or thyromegaly  Respiratory: CTA B/L; no W/R/R, no retractions  Cardiac: +S1/S2; RRR; no M/R/G; PMI non-displaced  Gastrointestinal: soft, NT/ND; no rebound or guarding; +BSx4  Back: no CVAT B/L  Extremities: WWP, no clubbing or cyanosis; no peripheral edema  Musculoskeletal: NROM x4; no joint swelling, tenderness or erythema  Vascular: 2+ radial, femoral, DP/PT pulses B/L  Dermatologic: R lateral thigh and lateral gluteal area with vesicular rash at various stages of healing  Lymphatic: no submandibular or cervical LAD  Neurologic: AAOx3; CNII-XII grossly intact; no focal deficits  Psychiatric: affect and characteristics of appearance, verbalizations, behaviors are appropriate Sarecycline Counseling: Patient advised regarding possible photosensitivity and discoloration of the teeth, skin, lips, tongue and gums.  Patient instructed to avoid sunlight, if possible.  When exposed to sunlight, patients should wear protective clothing, sunglasses, and sunscreen.  The patient was instructed to call the office immediately if the following severe adverse effects occur:  hearing changes, easy bruising/bleeding, severe headache, or vision changes.  The patient verbalized understanding of the proper use and possible adverse effects of sarecycline.  All of the patient's questions and concerns were addressed.

## 2023-06-26 NOTE — PROGRESS NOTE ADULT - SUBJECTIVE AND OBJECTIVE BOX
INTERVAL HPI/OVERNIGHT EVENTS:    OVERNIGHT: No overnight events.  SUBJECTIVE: Patient seen and examined at bedside.     ROS:  CV: Denies chest pain  Resp: Denies SOB  GI: Denies abdominal pain, constipation, diarrhea, nausea, vomiting  : Denies dysuria  ID: Denies fevers, chills  MSK: Denies joint pain     OBJECTIVE:    VITAL SIGNS:  ICU Vital Signs Last 24 Hrs  T(C): 36.6 (26 Mar 2018 05:20), Max: 37 (25 Mar 2018 18:45)  T(F): 97.9 (26 Mar 2018 05:20), Max: 98.6 (25 Mar 2018 18:45)  HR: 70 (26 Mar 2018 05:08) (66 - 82)  BP: 151/62 (26 Mar 2018 05:08) (101/44 - 151/62)  BP(mean): 99 (26 Mar 2018 05:08) (55 - 99)  ABP: --  ABP(mean): --  RR: 18 (26 Mar 2018 05:08) (14 - 20)  SpO2: 95% (26 Mar 2018 05:08) (92% - 95%)        03-25 @ 07:01  -  03-26 @ 07:00  --------------------------------------------------------  IN: 660 mL / OUT: 1950 mL / NET: -1290 mL      CAPILLARY BLOOD GLUCOSE          PHYSICAL EXAM:    General: NAD, comfortable  HEENT: NCAT, PERRL, clear conjunctiva, no scleral icterus  Neck: supple, no JVD  Respiratory: CTA b/l, no wheezing, rhonchi, rales  Cardiovascular: RRR, normal S1S2, no M/R/G  Abdomen: soft, NT/ND, bowel sounds in all four quadrants, no palpable masses  Extremities: WWP, no clubbing, cyanosis, or edema  Neuro:     MEDICATIONS:  MEDICATIONS  (STANDING):  amLODIPine   Tablet 10 milliGRAM(s) Oral daily  aspirin enteric coated 81 milliGRAM(s) Oral daily  atorvastatin 40 milliGRAM(s) Oral at bedtime  calcium carbonate  625 mG + Vitamin D (OsCal 250 + D) 1 Tablet(s) Oral daily  cholecalciferol 1000 Unit(s) Oral daily  cyanocobalamin 1000 MICROGram(s) Oral daily  furosemide    Tablet 20 milliGRAM(s) Oral daily  gabapentin 100 milliGRAM(s) Oral three times a day  heparin  Injectable 5000 Unit(s) SubCutaneous every 8 hours  levothyroxine 50 MICROGram(s) Oral daily  pantoprazole    Tablet 40 milliGRAM(s) Oral before breakfast  valACYclovir 1000 milliGRAM(s) Oral three times a day    MEDICATIONS  (PRN):  acetaminophen   Tablet. 650 milliGRAM(s) Oral every 6 hours PRN Mild Pain (1 - 3)  acetaminophen 300 mG/butalbital 50 mG/ caffeine 40 mG 1 Capsule(s) Oral every 6 hours PRN migraine  oxyCODONE    5 mG/acetaminophen 325 mG 1 Tablet(s) Oral every 6 hours PRN Moderate Pain (4 - 6)      ALLERGIES:  Allergies    ACTH-80 (Other)  corticosteroids (Hives)  shellfish (Unknown)    Intolerances        LABS:                        13.1   5.3   )-----------( 235      ( 26 Mar 2018 07:05 )             38.5     03-25    130<L>  |  92<L>  |  14  ----------------------------<  91  3.8   |  26  |  0.95    Ca    9.0      25 Mar 2018 06:10  Mg     1.8     03-25      PTT - ( 24 Mar 2018 15:39 )  PTT:59.4 sec      RADIOLOGY & ADDITIONAL TESTS: Reviewed. INTERVAL HPI/OVERNIGHT EVENTS:    OVERNIGHT: No overnight events.  SUBJECTIVE: Patient seen and examined at bedside. No complaints except right thigh pain from injection from [facility].    ROS:  CV: Denies chest pain  Resp: Denies SOB  GI: Denies abdominal pain, constipation, diarrhea, nausea, vomiting  : Denies dysuria  ID: Denies fevers, chills  MSK: Denies joint pain     OBJECTIVE:    VITAL SIGNS:  ICU Vital Signs Last 24 Hrs  T(C): 36.6 (26 Mar 2018 05:20), Max: 37 (25 Mar 2018 18:45)  T(F): 97.9 (26 Mar 2018 05:20), Max: 98.6 (25 Mar 2018 18:45)  HR: 70 (26 Mar 2018 05:08) (66 - 82)  BP: 151/62 (26 Mar 2018 05:08) (101/44 - 151/62)  BP(mean): 99 (26 Mar 2018 05:08) (55 - 99)  ABP: --  ABP(mean): --  RR: 18 (26 Mar 2018 05:08) (14 - 20)  SpO2: 95% (26 Mar 2018 05:08) (92% - 95%)        03-25 @ 07:01  -  03-26 @ 07:00  --------------------------------------------------------  IN: 660 mL / OUT: 1950 mL / NET: -1290 mL      CAPILLARY BLOOD GLUCOSE          PHYSICAL EXAM:    General: NAD, comfortable  HEENT: NCAT, PERRL, clear conjunctiva, no scleral icterus  Neck: supple, no JVD  Respiratory: CTA b/l, no wheezing, rhonchi, rales  Cardiovascular: RRR, slight systolic murmur   Abdomen: soft, NT/ND, bowel sounds in all four quadrants, no palpable masses  Extremities: WWP, no clubbing, cyanosis, 1+ pitting edema of lower extremities, red papules on right thigh       MEDICATIONS:  MEDICATIONS  (STANDING):  amLODIPine   Tablet 10 milliGRAM(s) Oral daily  aspirin enteric coated 81 milliGRAM(s) Oral daily  atorvastatin 40 milliGRAM(s) Oral at bedtime  calcium carbonate  625 mG + Vitamin D (OsCal 250 + D) 1 Tablet(s) Oral daily  cholecalciferol 1000 Unit(s) Oral daily  cyanocobalamin 1000 MICROGram(s) Oral daily  furosemide    Tablet 20 milliGRAM(s) Oral daily  gabapentin 100 milliGRAM(s) Oral three times a day  heparin  Injectable 5000 Unit(s) SubCutaneous every 8 hours  levothyroxine 50 MICROGram(s) Oral daily  pantoprazole    Tablet 40 milliGRAM(s) Oral before breakfast  valACYclovir 1000 milliGRAM(s) Oral three times a day    MEDICATIONS  (PRN):  acetaminophen   Tablet. 650 milliGRAM(s) Oral every 6 hours PRN Mild Pain (1 - 3)  acetaminophen 300 mG/butalbital 50 mG/ caffeine 40 mG 1 Capsule(s) Oral every 6 hours PRN migraine  oxyCODONE    5 mG/acetaminophen 325 mG 1 Tablet(s) Oral every 6 hours PRN Moderate Pain (4 - 6)      ALLERGIES:  Allergies    ACTH-80 (Other)  corticosteroids (Hives)  shellfish (Unknown)    Intolerances        LABS:                        13.1   5.3   )-----------( 235      ( 26 Mar 2018 07:05 )             38.5     03-25    130<L>  |  92<L>  |  14  ----------------------------<  91  3.8   |  26  |  0.95    Ca    9.0      25 Mar 2018 06:10  Mg     1.8     03-25      PTT - ( 24 Mar 2018 15:39 )  PTT:59.4 sec      RADIOLOGY & ADDITIONAL TESTS: Reviewed. (V5) oriented